# Patient Record
Sex: FEMALE | Race: WHITE | NOT HISPANIC OR LATINO | Employment: OTHER | URBAN - METROPOLITAN AREA
[De-identification: names, ages, dates, MRNs, and addresses within clinical notes are randomized per-mention and may not be internally consistent; named-entity substitution may affect disease eponyms.]

---

## 2023-09-01 PROBLEM — J31.0 CHRONIC RHINITIS: Status: ACTIVE | Noted: 2023-09-01

## 2023-09-01 PROBLEM — H93.13 TINNITUS OF BOTH EARS: Status: ACTIVE | Noted: 2023-09-01

## 2023-09-01 PROBLEM — K14.8 TONGUE MASS: Status: ACTIVE | Noted: 2023-09-01

## 2023-09-01 PROBLEM — E87.6 HYPOKALEMIA: Status: ACTIVE | Noted: 2023-09-01

## 2023-09-01 PROBLEM — R05.3 CHRONIC COUGH: Status: ACTIVE | Noted: 2023-09-01

## 2023-09-01 PROBLEM — I48.11 LONGSTANDING PERSISTENT ATRIAL FIBRILLATION (MULTI): Status: ACTIVE | Noted: 2023-09-01

## 2023-09-01 PROBLEM — R42 DIZZINESS: Status: ACTIVE | Noted: 2023-09-01

## 2023-09-01 PROBLEM — I48.91 AF (ATRIAL FIBRILLATION) (MULTI): Status: ACTIVE | Noted: 2023-09-01

## 2023-09-01 PROBLEM — I10 HTN (HYPERTENSION): Status: ACTIVE | Noted: 2023-09-01

## 2023-09-01 PROBLEM — E04.2 NONTOXIC MULTINODULAR GOITER: Status: ACTIVE | Noted: 2023-09-01

## 2023-09-01 PROBLEM — H90.3 SENSORINEURAL HEARING LOSS, BILATERAL: Status: ACTIVE | Noted: 2023-09-01

## 2023-09-01 RX ORDER — AZELASTINE 1 MG/ML
2 SPRAY, METERED NASAL 2 TIMES DAILY
COMMUNITY
Start: 2021-03-03 | End: 2024-01-15 | Stop reason: WASHOUT

## 2023-09-01 RX ORDER — ERGOCALCIFEROL 1.25 MG/1
1 CAPSULE ORAL
COMMUNITY
Start: 2021-05-03 | End: 2024-05-20 | Stop reason: ALTCHOICE

## 2023-09-01 RX ORDER — WARFARIN SODIUM 5 MG/1
1 TABLET ORAL DAILY
COMMUNITY
Start: 2023-05-25

## 2023-09-01 RX ORDER — METOPROLOL SUCCINATE 25 MG/1
1 TABLET, EXTENDED RELEASE ORAL DAILY
COMMUNITY
Start: 2021-05-02

## 2023-09-01 RX ORDER — CELECOXIB 100 MG/1
100 CAPSULE ORAL DAILY
COMMUNITY
Start: 2023-06-29 | End: 2023-09-27

## 2023-09-01 RX ORDER — TRAMADOL HYDROCHLORIDE 50 MG/1
50 TABLET ORAL EVERY 8 HOURS PRN
COMMUNITY

## 2023-09-01 RX ORDER — HYDROXYZINE HYDROCHLORIDE 25 MG/1
1 TABLET, FILM COATED ORAL DAILY PRN
COMMUNITY
Start: 2023-06-29 | End: 2024-05-20 | Stop reason: ALTCHOICE

## 2023-09-01 RX ORDER — SERTRALINE HYDROCHLORIDE 25 MG/1
25 TABLET, FILM COATED ORAL DAILY
COMMUNITY
Start: 2023-06-29 | End: 2024-01-15 | Stop reason: WASHOUT

## 2023-09-01 RX ORDER — AMLODIPINE BESYLATE 2.5 MG/1
1 TABLET ORAL DAILY
COMMUNITY

## 2023-09-01 RX ORDER — FUROSEMIDE 40 MG/1
40 TABLET ORAL DAILY
COMMUNITY

## 2023-09-01 RX ORDER — FERROUS SULFATE, DRIED 160(50) MG
1 TABLET, EXTENDED RELEASE ORAL
COMMUNITY
Start: 2021-03-11

## 2023-09-01 RX ORDER — ACETAMINOPHEN 500 MG/1
500 CAPSULE, LIQUID FILLED ORAL EVERY 6 HOURS PRN
COMMUNITY
End: 2024-02-12 | Stop reason: SDUPTHER

## 2023-09-01 RX ORDER — OMEPRAZOLE 40 MG/1
CAPSULE, DELAYED RELEASE ORAL
COMMUNITY
End: 2024-01-15 | Stop reason: ALTCHOICE

## 2023-09-01 RX ORDER — ASCORBIC ACID 500 MG
500 TABLET ORAL DAILY
COMMUNITY

## 2023-12-11 ENCOUNTER — HOSPITAL ENCOUNTER (EMERGENCY)
Facility: HOSPITAL | Age: 88
Discharge: HOME | End: 2023-12-11
Attending: EMERGENCY MEDICINE
Payer: MEDICARE

## 2023-12-11 ENCOUNTER — APPOINTMENT (OUTPATIENT)
Dept: RADIOLOGY | Facility: HOSPITAL | Age: 88
End: 2023-12-11
Payer: MEDICARE

## 2023-12-11 ENCOUNTER — APPOINTMENT (OUTPATIENT)
Dept: CARDIOLOGY | Facility: HOSPITAL | Age: 88
End: 2023-12-11
Payer: MEDICARE

## 2023-12-11 VITALS
DIASTOLIC BLOOD PRESSURE: 75 MMHG | RESPIRATION RATE: 18 BRPM | TEMPERATURE: 97.7 F | SYSTOLIC BLOOD PRESSURE: 112 MMHG | HEART RATE: 59 BPM | HEIGHT: 65 IN | BODY MASS INDEX: 23.82 KG/M2 | OXYGEN SATURATION: 97 % | WEIGHT: 143 LBS

## 2023-12-11 DIAGNOSIS — R07.9 CHEST PAIN, UNSPECIFIED TYPE: Primary | ICD-10-CM

## 2023-12-11 LAB
ANION GAP SERPL CALC-SCNC: 10 MMOL/L
BASOPHILS # BLD AUTO: 0.06 X10*3/UL (ref 0–0.1)
BASOPHILS NFR BLD AUTO: 0.8 %
BUN SERPL-MCNC: 22 MG/DL (ref 8–25)
CALCIUM SERPL-MCNC: 8.6 MG/DL (ref 8.5–10.4)
CHLORIDE SERPL-SCNC: 105 MMOL/L (ref 97–107)
CO2 SERPL-SCNC: 26 MMOL/L (ref 24–31)
CREAT SERPL-MCNC: 0.6 MG/DL (ref 0.4–1.6)
EOSINOPHIL # BLD AUTO: 0.19 X10*3/UL (ref 0–0.4)
EOSINOPHIL NFR BLD AUTO: 2.4 %
ERYTHROCYTE [DISTWIDTH] IN BLOOD BY AUTOMATED COUNT: 13.4 % (ref 11.5–14.5)
GFR SERPL CREATININE-BSD FRML MDRD: 85 ML/MIN/1.73M*2
GLUCOSE SERPL-MCNC: 90 MG/DL (ref 65–99)
HCT VFR BLD AUTO: 41.9 % (ref 36–46)
HGB BLD-MCNC: 13.1 G/DL (ref 12–16)
IMM GRANULOCYTES # BLD AUTO: 0.02 X10*3/UL (ref 0–0.5)
IMM GRANULOCYTES NFR BLD AUTO: 0.3 % (ref 0–0.9)
LYMPHOCYTES # BLD AUTO: 2.22 X10*3/UL (ref 0.8–3)
LYMPHOCYTES NFR BLD AUTO: 27.9 %
MCH RBC QN AUTO: 29.1 PG (ref 26–34)
MCHC RBC AUTO-ENTMCNC: 31.3 G/DL (ref 32–36)
MCV RBC AUTO: 93 FL (ref 80–100)
MONOCYTES # BLD AUTO: 0.53 X10*3/UL (ref 0.05–0.8)
MONOCYTES NFR BLD AUTO: 6.6 %
NEUTROPHILS # BLD AUTO: 4.95 X10*3/UL (ref 1.6–5.5)
NEUTROPHILS NFR BLD AUTO: 62 %
NRBC BLD-RTO: 0 /100 WBCS (ref 0–0)
PLATELET # BLD AUTO: 172 X10*3/UL (ref 150–450)
POTASSIUM SERPL-SCNC: 4.2 MMOL/L (ref 3.4–5.1)
RBC # BLD AUTO: 4.5 X10*6/UL (ref 4–5.2)
SODIUM SERPL-SCNC: 141 MMOL/L (ref 133–145)
TROPONIN T SERPL-MCNC: 10 NG/L
TROPONIN T SERPL-MCNC: 10 NG/L
WBC # BLD AUTO: 8 X10*3/UL (ref 4.4–11.3)

## 2023-12-11 PROCEDURE — 80048 BASIC METABOLIC PNL TOTAL CA: CPT | Performed by: STUDENT IN AN ORGANIZED HEALTH CARE EDUCATION/TRAINING PROGRAM

## 2023-12-11 PROCEDURE — 2500000004 HC RX 250 GENERAL PHARMACY W/ HCPCS (ALT 636 FOR OP/ED): Performed by: EMERGENCY MEDICINE

## 2023-12-11 PROCEDURE — 93005 ELECTROCARDIOGRAM TRACING: CPT

## 2023-12-11 PROCEDURE — 99284 EMERGENCY DEPT VISIT MOD MDM: CPT | Performed by: EMERGENCY MEDICINE

## 2023-12-11 PROCEDURE — 85025 COMPLETE CBC W/AUTO DIFF WBC: CPT | Performed by: STUDENT IN AN ORGANIZED HEALTH CARE EDUCATION/TRAINING PROGRAM

## 2023-12-11 PROCEDURE — 84484 ASSAY OF TROPONIN QUANT: CPT | Performed by: STUDENT IN AN ORGANIZED HEALTH CARE EDUCATION/TRAINING PROGRAM

## 2023-12-11 PROCEDURE — 96375 TX/PRO/DX INJ NEW DRUG ADDON: CPT

## 2023-12-11 PROCEDURE — 71045 X-RAY EXAM CHEST 1 VIEW: CPT

## 2023-12-11 PROCEDURE — 36415 COLL VENOUS BLD VENIPUNCTURE: CPT | Performed by: STUDENT IN AN ORGANIZED HEALTH CARE EDUCATION/TRAINING PROGRAM

## 2023-12-11 PROCEDURE — 2500000001 HC RX 250 WO HCPCS SELF ADMINISTERED DRUGS (ALT 637 FOR MEDICARE OP): Performed by: STUDENT IN AN ORGANIZED HEALTH CARE EDUCATION/TRAINING PROGRAM

## 2023-12-11 PROCEDURE — 96374 THER/PROPH/DIAG INJ IV PUSH: CPT

## 2023-12-11 RX ORDER — FAMOTIDINE 10 MG/ML
20 INJECTION INTRAVENOUS ONCE
Status: COMPLETED | OUTPATIENT
Start: 2023-12-11 | End: 2023-12-11

## 2023-12-11 RX ORDER — ASPIRIN 325 MG
325 TABLET ORAL ONCE
Status: COMPLETED | OUTPATIENT
Start: 2023-12-11 | End: 2023-12-11

## 2023-12-11 RX ORDER — ONDANSETRON HYDROCHLORIDE 2 MG/ML
4 INJECTION, SOLUTION INTRAVENOUS ONCE
Status: COMPLETED | OUTPATIENT
Start: 2023-12-11 | End: 2023-12-11

## 2023-12-11 RX ORDER — MORPHINE SULFATE 4 MG/ML
4 INJECTION, SOLUTION INTRAMUSCULAR; INTRAVENOUS ONCE
Status: DISCONTINUED | OUTPATIENT
Start: 2023-12-11 | End: 2023-12-11 | Stop reason: HOSPADM

## 2023-12-11 RX ADMIN — FAMOTIDINE 20 MG: 10 INJECTION INTRAVENOUS at 12:47

## 2023-12-11 RX ADMIN — ONDANSETRON 4 MG: 2 INJECTION INTRAMUSCULAR; INTRAVENOUS at 12:47

## 2023-12-11 RX ADMIN — ASPIRIN 325 MG: 325 TABLET ORAL at 12:48

## 2023-12-11 ASSESSMENT — PAIN SCALES - GENERAL
PAINLEVEL_OUTOF10: 0 - NO PAIN
PAINLEVEL_OUTOF10: 0 - NO PAIN
PAINLEVEL_OUTOF10: 4

## 2023-12-11 ASSESSMENT — LIFESTYLE VARIABLES
EVER HAD A DRINK FIRST THING IN THE MORNING TO STEADY YOUR NERVES TO GET RID OF A HANGOVER: NO
EVER FELT BAD OR GUILTY ABOUT YOUR DRINKING: NO
HAVE YOU EVER FELT YOU SHOULD CUT DOWN ON YOUR DRINKING: NO
HAVE PEOPLE ANNOYED YOU BY CRITICIZING YOUR DRINKING: NO
REASON UNABLE TO ASSESS: NO

## 2023-12-11 ASSESSMENT — PAIN DESCRIPTION - PAIN TYPE: TYPE: ACUTE PAIN

## 2023-12-11 ASSESSMENT — PAIN - FUNCTIONAL ASSESSMENT
PAIN_FUNCTIONAL_ASSESSMENT: 0-10
PAIN_FUNCTIONAL_ASSESSMENT: 0-10

## 2023-12-11 ASSESSMENT — PAIN DESCRIPTION - LOCATION: LOCATION: CHEST

## 2023-12-11 NOTE — ED TRIAGE NOTES
HPI   Chief Complaint   Patient presents with    Chest Pain       The patient was seen by me in triage capacity at with the goal of rapid assessment and initiation of care.  The patient will have a complete history and physical exam performed and documented by another provider.    89-year-old female presents with chest pain.  History of atrial fibrillation.  Per patient patient's daughter, patient had a one-time episode of severe chest pain for ED visit.    Per chart review, patient on warfarin.            Medical Decision Making  Patient is to go to main bed for further evaluation.  Labs, EKG, chest x-ray ordered.        Procedure  Procedures

## 2023-12-11 NOTE — ED PROVIDER NOTES
HPI   Chief Complaint   Patient presents with    Chest Pain       A 89-year-old female who presents to the emergency department with concerns of chest pain that started today.  Patient is accompanied by her 2 daughters who provide the history.  The daughter states that the patient was sitting in her chair today and started saying that her chest felt very tight.  The daughter states that this lasted for about 30 seconds.  Patient does have a history of atrial fibrillation.  The daughter states that she hit the patient's chest to try to get her out of A-fib without what was going on.  The patient states that the pain subsided after about 30 seconds and she has had some random bouts of more dull anterior chest pain since.  She takes Coumadin and metoprolol 25mg XL qd for the A-fib.  Patient currently does not have any chest pain.  There is no shortness of breath, fever, chills, cough, nasal congestion, abdominal pain, nausea, vomiting, diaphoresis.  Her cardiologist is Dr. Mcnally.  Her primary care doctor is Dr. Cowan.         Please see HPI for pertinent positive and negative ROS.                   No data recorded                Patient History   Past Medical History:   Diagnosis Date    Other secondary hypertension     Other secondary hypertension    Personal history of other diseases of the circulatory system     History of hypertension     Past Surgical History:   Procedure Laterality Date    OTHER SURGICAL HISTORY  03/02/2021    Appendectomy     No family history on file.  Social History     Tobacco Use    Smoking status: Not on file    Smokeless tobacco: Not on file   Substance Use Topics    Alcohol use: Not on file    Drug use: Not on file       Physical Exam   ED Triage Vitals [12/11/23 1149]   Temp Heart Rate Resp BP   36.5 °C (97.7 °F) 65 18 108/61      SpO2 Temp src Heart Rate Source Patient Position   100 % -- -- --      BP Location FiO2 (%)     -- --       Physical Exam  GENERAL APPEARANCE: Awake and  alert. No acute distress.   VITAL SIGNS: As per the nurses' triage record.  HEENT: Normocephalic, atraumatic. Extraocular muscles are intact. Conjunctiva are pink. Negative scleral icterus. Mucous membranes are moist. Tongue in the midline. Oropharynx clear, uvula midline.   NECK: Soft, nontender and supple, full gross ROM, no meningeal signs.  CHEST: Nontender to palpation. Clear to auscultation bilaterally. No rales, rhonchi, or wheezing. Symmetric rise and fall of chest wall.   HEART: Clear S1 and S2. Regular rate and rhythm. No murmurs appreciated on auscultation.  Strong and equal pulses in the extremities.  ABDOMEN: Soft, nontender, nondistended, positive bowel sounds, no palpable masses.  MUSCULOSKELETAL: The calves are nontender to palpation. Full gross active range of motion.  NEUROLOGICAL: Awake, alert and oriented x 3. Motor power intact in the upper and lower extremities. Sensation is intact to light touch in the upper and lower extremities. Patient answering questions appropriately.   IMMUNOLOGICAL: No lymphatic streaking noted  DERMATOLOGIC: Warm and dry without petechiae, rashes, or ecchymosis noted on visible skin.   PYSCH: Cooperative with appropriate mood and affect.  ED Course & MDM   ED Course as of 12/11/23 1544   Mon Dec 11, 2023   1355 X-ray shows no acute cardiopulmonary process. [SC]   1434 HEART SCORE 4 [SC]   1532 Initial Troponin T 10, repeat troponin T 10. Consult placed to Dr. Mcnally, patient's cardiologist.  [SC]      ED Course User Index  [SC] Key Hitchcock PA-C         Diagnoses as of 12/11/23 1544   Chest pain, unspecified type       Medical Decision Making  Parts of this chart have been completed using voice recognition software. Please excuse any errors of transcription.  My thought process and reason for plan has been formulated from the time that I saw the patient until the time of disposition and is not specific to one specific moment during their visit and furthermore my  MDM encompasses this entire chart and not only this text box.      HPI: Detailed above.    Exam: A medically appropriate exam performed, outlined above, given the known history and presentation.    History obtained from: Patient    EKG: See my supervising physician's EKG interpretation    Social Determinants of Health considered during this visit: Lives at home    Medications given during visit:  Medications   morphine injection 4 mg (4 mg intravenous Not Given 12/11/23 1210)   aspirin tablet 325 mg (325 mg oral Given 12/11/23 1248)   famotidine PF (Pepcid) injection 20 mg (20 mg intravenous Given 12/11/23 1247)   ondansetron (Zofran) injection 4 mg (4 mg intravenous Given 12/11/23 1247)        Diagnostic/tests  Labs Reviewed   CBC WITH AUTO DIFFERENTIAL - Abnormal       Result Value    WBC 8.0      nRBC 0.0      RBC 4.50      Hemoglobin 13.1      Hematocrit 41.9      MCV 93      MCH 29.1      MCHC 31.3 (*)     RDW 13.4      Platelets 172      Neutrophils % 62.0      Immature Granulocytes %, Automated 0.3      Lymphocytes % 27.9      Monocytes % 6.6      Eosinophils % 2.4      Basophils % 0.8      Neutrophils Absolute 4.95      Immature Granulocytes Absolute, Automated 0.02      Lymphocytes Absolute 2.22      Monocytes Absolute 0.53      Eosinophils Absolute 0.19      Basophils Absolute 0.06     BASIC METABOLIC PANEL - Normal    Glucose 90      Sodium 141      Potassium 4.2      Chloride 105      Bicarbonate 26      Urea Nitrogen 22      Creatinine 0.60      eGFR 85      Calcium 8.6      Anion Gap 10     SERIAL TROPONIN, INITIAL ([facility]) - Normal    Troponin T, High Sensitivity 10     SERIAL TROPONIN,  2 HOUR ([facility]) - Normal    Troponin T, High Sensitivity 10     TROPONIN T SERIES, HIGH SENSITIVITY (0, 2 HR, 6 HR)    Narrative:     The following orders were created for panel order Troponin T Series, High Sensitivity (0, 2HR, 6HR).  Procedure                               Abnormality         Status                      ---------                               -----------         ------                     Serial Troponin, Initial...[[id number]]  Normal              Final result               Serial Troponin, 2 Hour ...[[id number]]  Normal              Final result               Serial Troponin, 6 Hour ...[[id number]]                                                   Please view results for these tests on the individual orders.   SERIAL TROPONIN, 6 HOUR ([facility])      XR chest 1 view   Final Result   No evidence of acute cardiopulmonary process.             MACRO:   None        Signed by: Darryn Akhtar 12/11/2023 1:33 PM   Dictation workstation:   [id number]           Considerations/further MDM:  Patient was seen in conjucntion with my supervising physician,  . Please refer to his / her note.    I have considered and evaluated for the following diagnoses and estimate there is LOW risk for the following diagnoses: ACUTE CORONARY SYNDROME INCLUDING MI, AORTIC DISSECTION, PERICARDIAL EFFUSION or TAMPONADE, PULMONARY EMBOLISM, PNEUMONIA, PNEUMOTHORAX, RESPIRATORY DISTRESS or COMPROMISE, MALIGNANT DYSRHYTHMIA or HYPERTENSION, SEPSIS.     I discussed the case with patient's cardiologist Dr. Horton.  I went over the laboratory eval, heart score 4, and patient's history consistent with sharp onset of anterior chest wall pain x 30 seconds with dull or episodes of chest pain afterwards.     He states the patient is safe for discharge with follow-up with him in an outpatient setting.  He relates the patient to take over-the-counter ibuprofen for 1 to 2 days.  He told me to have the patient call his office to schedule an appointment.  This will be listed on the patient's discharge paperwork.  Daughter states that patient does have an appoint with Dr. Mcnally this Thursday.    Patient was discharged with instructions to follow-up with cardiologist and her primary care doctor within 1 to 2 days.  She will take over-the-counter ibuprofen x 1 to 2 days.   Patient return to emergency department new or worsening symptoms.      Procedure  Procedures     Key Hitchcock PA-C  12/11/23 1549

## 2023-12-11 NOTE — DISCHARGE INSTRUCTIONS
Please follow-up with Dr. Mcnally.  We did call him today and he would like you to call his office to schedule an outpatient appointment.    Follow-up with your primary care doctor 1 to 2 days.    Please take over-the-counter ibuprofen for the chest wall pain x 1 to 2 days.    Please return to the emergency department new or worsening symptoms.

## 2023-12-11 NOTE — PROGRESS NOTES
Attestation note/supervisory note for SUBHASH Hitchcock      The patient is a 89-year-old female presenting to the emergency department for evaluation of chest pain.  The patient reportedly had several spasms of chest pain several hours prior to arrival.  Her daughter thought that she may be in rapid A-fib so she did cross her chest to see if she could get some improvement in her symptoms.  The patient currently states that she does not have any symptoms.  She denies having any headache or visual changes.  No chest pain or shortness of breath.  No abdominal pain.  No nausea vomiting.  No diarrhea or constipation but no urinary complaints.  The patient's daughter reports that she was complaining of chest pain prior to arrival.  She reportedly does have a history of atrial fibrillation and is on Eliquis.  Her cardiologist is Dr. Mcnally.  She reportedly does not have any history of CAD or ACS.  No history of PE or DVT.  No cough or congestion.  No fever or chills.  No recent injury or trauma.  All pertinent positives and negatives are recorded above.  All other systems reviewed and otherwise negative.  Vital signs within normal limits.  Physical exam with a well-nourished well-developed female in no acute distress.  HEENT exam with dry mucous membranes but otherwise unremarkable.  She has no evidence of airway compromise or respiratory distress.  Abdominal exam is benign.  She is able to converse without difficulty.  She has no gross motor, neurologic or vascular deficits on exam.      EKG with atrial fibrillation at 65 bpm, normal axis, normal voltage, normal ST segment, and a slight diffuse flattening of the T waves      Oral aspirin, IV Pepcid, IV morphine and IV Zofran ordered.      Diagnostic labs without significant abnormality      Initial Troponin T 10. Repeat trop T 10      Heart score 4      XR chest 1 view   Final Result   No evidence of acute cardiopulmonary process.             MACRO:   None        Signed by:  Darryn Anyu 12/11/2023 1:33 PM   Dictation workstation:   [id number]           The patient does not have any notes of ischemia on EKG or cardiac enzymes.  No events on telemetry.  She has no acute process on chest x-ray.  No evidence of pneumothorax or pneumonia.  No evidence of CHF.       The patient does have a heart score of 4 and her cardiologist, Dr. Mcnally, was consulted.  He did recommend outpatient follow-up in his office for further management of her symptoms but did not feel that she needed to be admitted at this time.        The patient was released in good condition in the company of her daughters.  She will follow-up with her [facility] physician within 1 to 2 days for further management of her current symptoms.  She will also follow-up with cardiology within 1 to 2 days for further management of her chest pain.  She will return to the emergency department sooner with worsening of symptoms or onset of new symptoms.      Impression/diagnosis  Chest pain, substernal      I personally saw the patient and performed a substantive portion of the visit including all aspects of the medical decision making.      I reviewed the results of the diagnostic labs and diagnostic imaging.  Formal radiology reading was completed by the radiologist    Caitie Singer MD

## 2023-12-14 ENCOUNTER — OFFICE VISIT (OUTPATIENT)
Dept: CARDIOLOGY | Facility: CLINIC | Age: 88
End: 2023-12-14
Payer: MEDICARE

## 2023-12-14 VITALS — HEART RATE: 72 BPM | SYSTOLIC BLOOD PRESSURE: 110 MMHG | DIASTOLIC BLOOD PRESSURE: 64 MMHG | OXYGEN SATURATION: 97 %

## 2023-12-14 DIAGNOSIS — I48.11 LONGSTANDING PERSISTENT ATRIAL FIBRILLATION (MULTI): Primary | ICD-10-CM

## 2023-12-14 DIAGNOSIS — I10 PRIMARY HYPERTENSION: ICD-10-CM

## 2023-12-14 LAB
Q ONSET: 225 MS
QRS COUNT: 10 BEATS
QRS DURATION: 82 MS
QT INTERVAL: 426 MS
QTC CALCULATION(BAZETT): 443 MS
QTC FREDERICIA: 437 MS
R AXIS: 61 DEGREES
T AXIS: 57 DEGREES
T OFFSET: 438 MS
VENTRICULAR RATE: 65 BPM

## 2023-12-14 PROCEDURE — 99213 OFFICE O/P EST LOW 20 MIN: CPT | Performed by: INTERNAL MEDICINE

## 2023-12-14 PROCEDURE — 1126F AMNT PAIN NOTED NONE PRSNT: CPT | Performed by: INTERNAL MEDICINE

## 2023-12-14 PROCEDURE — 3078F DIAST BP <80 MM HG: CPT | Performed by: INTERNAL MEDICINE

## 2023-12-14 PROCEDURE — 3074F SYST BP LT 130 MM HG: CPT | Performed by: INTERNAL MEDICINE

## 2023-12-14 PROCEDURE — 1159F MED LIST DOCD IN RCRD: CPT | Performed by: INTERNAL MEDICINE

## 2023-12-14 PROCEDURE — 1036F TOBACCO NON-USER: CPT | Performed by: INTERNAL MEDICINE

## 2023-12-14 RX ORDER — TRAZODONE HYDROCHLORIDE 50 MG/1
25 TABLET ORAL NIGHTLY
COMMUNITY

## 2023-12-14 ASSESSMENT — ENCOUNTER SYMPTOMS
OCCASIONAL FEELINGS OF UNSTEADINESS: 0
COUGH: 0
SHORTNESS OF BREATH: 0
HEMATURIA: 0
ABDOMINAL PAIN: 0
DYSPNEA ON EXERTION: 0
DEPRESSION: 1
PALPITATIONS: 0
DYSURIA: 0
NUMBNESS: 0
PARESTHESIAS: 0
LOSS OF SENSATION IN FEET: 0
BLURRED VISION: 0

## 2023-12-14 ASSESSMENT — PATIENT HEALTH QUESTIONNAIRE - PHQ9
SUM OF ALL RESPONSES TO PHQ9 QUESTIONS 1 & 2: 0
2. FEELING DOWN, DEPRESSED OR HOPELESS: NOT AT ALL
1. LITTLE INTEREST OR PLEASURE IN DOING THINGS: NOT AT ALL

## 2023-12-14 NOTE — ASSESSMENT & PLAN NOTE
No prolonged palpitations, atypical chest discomfort which sounds inflammatory.  Suggested NSAI PRN.

## 2023-12-14 NOTE — PROGRESS NOTES
Jose Tran is a 89 y.o. female.    Chief Complaint:  6 MONTH F/U and hosp f/u    HPI  Patient went to the emergency department on Monday with some sharp chest discomfort and some unusual feelings in the chest.  Negative workup and then discharged back to home.  Review of Systems   Constitutional: Negative for malaise/fatigue.   HENT:  Negative for congestion.    Eyes:  Negative for blurred vision.   Cardiovascular:  Negative for chest pain, dyspnea on exertion and palpitations.   Respiratory:  Negative for cough and shortness of breath.    Musculoskeletal:  Negative for joint pain.   Gastrointestinal:  Negative for abdominal pain.   Genitourinary:  Negative for dysuria and hematuria.   Neurological:  Negative for numbness and paresthesias.       Objective   Constitutional:       Appearance: Not in distress.   Eyes:      Conjunctiva/sclera: Conjunctivae normal.   Neck:      Vascular: JVD normal.   Pulmonary:      Breath sounds: Normal breath sounds. No wheezing. No rhonchi. No rales.   Cardiovascular:      Normal rate. Irregularly irregular rhythm.      Murmurs: There is no murmur.      No gallop.  No click. No rub.   Abdominal:      Palpations: Abdomen is soft.   Neurological:      General: No focal deficit present.      Mental Status: Alert.         Assessment/Plan   The primary encounter diagnosis was Longstanding persistent atrial fibrillation (CMS/HCC). A diagnosis of Primary hypertension was also pertinent to this visit.    Longstanding persistent atrial fibrillation (CMS/HCC)  No prolonged palpitations, atypical chest discomfort which sounds inflammatory.  Suggested NSAI PRN.    HTN (hypertension)  Blood pressure is very well-controlled we will continue to follow.

## 2023-12-18 ENCOUNTER — OFFICE VISIT (OUTPATIENT)
Dept: OTOLARYNGOLOGY | Facility: CLINIC | Age: 88
End: 2023-12-18
Payer: MEDICARE

## 2023-12-18 VITALS — WEIGHT: 147 LBS | HEIGHT: 65 IN | TEMPERATURE: 97.5 F | BODY MASS INDEX: 24.49 KG/M2

## 2023-12-18 DIAGNOSIS — H93.8X2 MASS OF LEFT EAR: Primary | ICD-10-CM

## 2023-12-18 DIAGNOSIS — H61.23 BILATERAL IMPACTED CERUMEN: ICD-10-CM

## 2023-12-18 PROCEDURE — 99213 OFFICE O/P EST LOW 20 MIN: CPT | Performed by: OTOLARYNGOLOGY

## 2023-12-18 PROCEDURE — 1159F MED LIST DOCD IN RCRD: CPT | Performed by: OTOLARYNGOLOGY

## 2023-12-18 PROCEDURE — 1036F TOBACCO NON-USER: CPT | Performed by: OTOLARYNGOLOGY

## 2023-12-18 PROCEDURE — 1126F AMNT PAIN NOTED NONE PRSNT: CPT | Performed by: OTOLARYNGOLOGY

## 2023-12-18 PROCEDURE — 1160F RVW MEDS BY RX/DR IN RCRD: CPT | Performed by: OTOLARYNGOLOGY

## 2023-12-18 NOTE — PROGRESS NOTES
DOMINIQUE Tran is a 89 y.o. female history of recurrent cerumen impactions, nasal polyps.  She has been doing reasonably well.  Wears hearing aids bilaterally.  Has been demonstrating symptoms of needing cleaning.  She has been doing okay with her nasal symptoms.  A new issue was brought up that she has been fussing with a small mass/cyst posterior to the left earlobe.  Physically this is consistent with a sebaceous cyst.      Past Medical History:   Diagnosis Date    Hypertension     Other secondary hypertension     Other secondary hypertension    Personal history of other diseases of the circulatory system     History of hypertension            Medications:     Current Outpatient Medications:     acetaminophen (Tylenol) 500 mg capsule, Take 1 capsule (500 mg) by mouth every 6 hours if needed., Disp: , Rfl:     amLODIPine (Norvasc) 2.5 mg tablet, Take 1 tablet (2.5 mg) by mouth once daily., Disp: , Rfl:     ascorbic acid (Vitamin C) 500 mg tablet, Take 1 tablet (500 mg) by mouth once daily., Disp: , Rfl:     calcium carbonate-vitamin D3 (Oysco 500/D) 500 mg-5 mcg (200 unit) tablet, Take 1 tablet by mouth 2 times a day with meals., Disp: , Rfl:     ergocalciferol (Vitamin D-2) 1.25 MG (65878 UT) capsule, Take 1 capsule (1,250 mcg) by mouth 1 (one) time per week., Disp: , Rfl:     fexofenadine/pseudoephedrine (ALLEGRA-D 24 HOUR ORAL), Allegra-D 24 Hour, Disp: , Rfl:     furosemide (Lasix) 40 mg tablet, Take 1 tablet (40 mg) by mouth once daily., Disp: , Rfl:     metoprolol succinate XL (Toprol-XL) 25 mg 24 hr tablet, Take 1 tablet (25 mg) by mouth once daily., Disp: , Rfl:     traMADol (Ultram) 50 mg tablet, Take 1 tablet (50 mg) by mouth every 6 hours if needed., Disp: , Rfl:     vit C/E/Zn/coppr/lutein/zeaxan (PRESERVISION AREDS-2 ORAL), Take by mouth., Disp: , Rfl:     warfarin (Coumadin) 5 mg tablet, Take 1 tablet (5 mg) by mouth once daily. As directed, Disp: , Rfl:     azelastine (Astelin) 137 mcg  "(0.1 %) nasal spray, Administer 2 sprays into each nostril 2 times a day. 30 DAYS, Disp: , Rfl:     hydrOXYzine HCL (Atarax) 25 mg tablet, Take 1 tablet (25 mg) by mouth once daily as needed for anxiety., Disp: , Rfl:     omeprazole (PriLOSEC) 40 mg DR capsule, Omeprazole CPDR  Refills: 0     Active, Disp: , Rfl:     sertraline (Zoloft) 25 mg tablet, Take 1 tablet (25 mg) by mouth once daily., Disp: , Rfl:     traZODone (Desyrel) 50 mg tablet, Take 1 tablet (50 mg) by mouth once daily at bedtime. 1/2 tab, Disp: , Rfl:      Allergies:  Allergies   Allergen Reactions    Codeine Nausea And Vomiting    Hydrocodone-Homatropine Diarrhea     Vomiting         Physical Exam:  Last Recorded Vitals  Temperature 36.4 °C (97.5 °F), height 1.651 m (5' 5\"), weight 66.7 kg (147 lb).  General:     General appearance: Well-developed, well-nourished in no acute distress.       Voice:  normal       Head/face: Normal appearance; nontender to palpation     Facial nerve function: Normal and symmetric bilaterally.    Oral/oropharynx:     Oral vestibule: Normal labial and gingival mucosa     Tongue/floor of mouth: Normal without lesion     Oropharynx: Clear.  No lesions present of the hard/soft palate, posterior pharynx    Neck:     Neck: Normal appearance, trachea midline     Salivary glands: Normal to palpation bilaterally     Lymph nodes: No cervical lymphadenopathy to palpation     Thyroid: No thyromegaly.  No palpable nodules     Range of motion: Normal    Neurological:     Cortical functions: Alert and oriented x3, appropriate affect       Larynx/hypopharynx:     Laryngeal findings: Mirror exam inadequate or limited secondary to enlarged base of tongue and/or excessive gagging    Ear:     Ear canal: Normal bilaterally after cleaning down cerumen impaction bilaterally     Tympanic membrane: Intact and mobile bilaterally     Pinna: Normal bilaterally.  1 cm postauricular cyst around the earlobe on the left-hand side as described above.  " No skin changes.  No active bleeding.     Hearing:  Gross hearing assessment normal by voice    Nose:     Visualized using: Anterior rhinoscopy     Nasopharynx: Inadequate mirror exam secondary to gag, anatomy.       Nasal dorsum: Nontraumatic midline appearance     Septum: Midline     Inferior turbinates: Normally sized     Mucosa: Bilateral, pink, normal appearing       Assessment/Plan   Ears cleaned bilaterally.  Replaced hearing aids and this showed improvement.  I have offered removal of the cyst behind her left ear.  We could do this in the office but would need to schedule some additional time.  They will think on this and find some time to schedule.  I will see her back in 6 months for routine cerumen management         Ramakrishna Driver MD

## 2023-12-26 ENCOUNTER — APPOINTMENT (OUTPATIENT)
Dept: RADIOLOGY | Facility: HOSPITAL | Age: 88
End: 2023-12-26
Payer: MEDICARE

## 2023-12-26 ENCOUNTER — APPOINTMENT (OUTPATIENT)
Dept: CARDIOLOGY | Facility: HOSPITAL | Age: 88
End: 2023-12-26
Payer: MEDICARE

## 2023-12-26 ENCOUNTER — HOSPITAL ENCOUNTER (EMERGENCY)
Facility: HOSPITAL | Age: 88
Discharge: HOME | End: 2023-12-26
Attending: EMERGENCY MEDICINE
Payer: MEDICARE

## 2023-12-26 VITALS
HEART RATE: 61 BPM | DIASTOLIC BLOOD PRESSURE: 72 MMHG | OXYGEN SATURATION: 99 % | BODY MASS INDEX: 24.16 KG/M2 | WEIGHT: 145 LBS | HEIGHT: 65 IN | RESPIRATION RATE: 18 BRPM | SYSTOLIC BLOOD PRESSURE: 124 MMHG | TEMPERATURE: 98.1 F

## 2023-12-26 DIAGNOSIS — R41.0 CONFUSION: Primary | ICD-10-CM

## 2023-12-26 DIAGNOSIS — R45.1 RESTLESSNESS: ICD-10-CM

## 2023-12-26 LAB
ALBUMIN SERPL-MCNC: 4 G/DL (ref 3.5–5)
ALP BLD-CCNC: 89 U/L (ref 35–125)
ALT SERPL-CCNC: 12 U/L (ref 5–40)
ANION GAP SERPL CALC-SCNC: 11 MMOL/L
APPEARANCE UR: CLEAR
AST SERPL-CCNC: 25 U/L (ref 5–40)
BASOPHILS # BLD AUTO: 0.07 X10*3/UL (ref 0–0.1)
BASOPHILS NFR BLD AUTO: 0.9 %
BILIRUB SERPL-MCNC: 0.6 MG/DL (ref 0.1–1.2)
BILIRUB UR STRIP.AUTO-MCNC: NEGATIVE MG/DL
BUN SERPL-MCNC: 21 MG/DL (ref 8–25)
CALCIUM SERPL-MCNC: 8.8 MG/DL (ref 8.5–10.4)
CHLORIDE SERPL-SCNC: 103 MMOL/L (ref 97–107)
CO2 SERPL-SCNC: 25 MMOL/L (ref 24–31)
COLOR UR: NORMAL
CREAT SERPL-MCNC: 0.6 MG/DL (ref 0.4–1.6)
EOSINOPHIL # BLD AUTO: 0.27 X10*3/UL (ref 0–0.4)
EOSINOPHIL NFR BLD AUTO: 3.4 %
ERYTHROCYTE [DISTWIDTH] IN BLOOD BY AUTOMATED COUNT: 13.2 % (ref 11.5–14.5)
FLUAV RNA RESP QL NAA+PROBE: NOT DETECTED
FLUBV RNA RESP QL NAA+PROBE: NOT DETECTED
GFR SERPL CREATININE-BSD FRML MDRD: 85 ML/MIN/1.73M*2
GLUCOSE SERPL-MCNC: 85 MG/DL (ref 65–99)
GLUCOSE UR STRIP.AUTO-MCNC: NORMAL MG/DL
HCT VFR BLD AUTO: 43.2 % (ref 36–46)
HGB BLD-MCNC: 13.5 G/DL (ref 12–16)
IMM GRANULOCYTES # BLD AUTO: 0.03 X10*3/UL (ref 0–0.5)
IMM GRANULOCYTES NFR BLD AUTO: 0.4 % (ref 0–0.9)
KETONES UR STRIP.AUTO-MCNC: NEGATIVE MG/DL
LEUKOCYTE ESTERASE UR QL STRIP.AUTO: NEGATIVE
LIPASE SERPL-CCNC: 41 U/L (ref 16–63)
LYMPHOCYTES # BLD AUTO: 2.29 X10*3/UL (ref 0.8–3)
LYMPHOCYTES NFR BLD AUTO: 28.8 %
MCH RBC QN AUTO: 28.7 PG (ref 26–34)
MCHC RBC AUTO-ENTMCNC: 31.3 G/DL (ref 32–36)
MCV RBC AUTO: 92 FL (ref 80–100)
MONOCYTES # BLD AUTO: 0.53 X10*3/UL (ref 0.05–0.8)
MONOCYTES NFR BLD AUTO: 6.7 %
MUCOUS THREADS #/AREA URNS AUTO: NORMAL /LPF
NEUTROPHILS # BLD AUTO: 4.77 X10*3/UL (ref 1.6–5.5)
NEUTROPHILS NFR BLD AUTO: 59.8 %
NITRITE UR QL STRIP.AUTO: NEGATIVE
NRBC BLD-RTO: 0 /100 WBCS (ref 0–0)
PH UR STRIP.AUTO: 6.5 [PH]
PLATELET # BLD AUTO: 218 X10*3/UL (ref 150–450)
POTASSIUM SERPL-SCNC: 4.7 MMOL/L (ref 3.4–5.1)
PROT SERPL-MCNC: 7 G/DL (ref 5.9–7.9)
PROT UR STRIP.AUTO-MCNC: NORMAL MG/DL
RBC # BLD AUTO: 4.7 X10*6/UL (ref 4–5.2)
RBC # UR STRIP.AUTO: NEGATIVE /UL
RBC #/AREA URNS AUTO: NORMAL /HPF
SARS-COV-2 RNA RESP QL NAA+PROBE: NOT DETECTED
SODIUM SERPL-SCNC: 139 MMOL/L (ref 133–145)
SP GR UR STRIP.AUTO: 1.02
SQUAMOUS #/AREA URNS AUTO: NORMAL /HPF
TROPONIN T SERPL-MCNC: 11 NG/L
UROBILINOGEN UR STRIP.AUTO-MCNC: NORMAL MG/DL
WBC # BLD AUTO: 8 X10*3/UL (ref 4.4–11.3)
WBC #/AREA URNS AUTO: NORMAL /HPF

## 2023-12-26 PROCEDURE — 71045 X-RAY EXAM CHEST 1 VIEW: CPT

## 2023-12-26 PROCEDURE — 2500000004 HC RX 250 GENERAL PHARMACY W/ HCPCS (ALT 636 FOR OP/ED): Performed by: EMERGENCY MEDICINE

## 2023-12-26 PROCEDURE — 80053 COMPREHEN METABOLIC PANEL: CPT | Performed by: EMERGENCY MEDICINE

## 2023-12-26 PROCEDURE — 99285 EMERGENCY DEPT VISIT HI MDM: CPT | Performed by: EMERGENCY MEDICINE

## 2023-12-26 PROCEDURE — 2550000001 HC RX 255 CONTRASTS: Performed by: EMERGENCY MEDICINE

## 2023-12-26 PROCEDURE — 83690 ASSAY OF LIPASE: CPT | Performed by: EMERGENCY MEDICINE

## 2023-12-26 PROCEDURE — 36415 COLL VENOUS BLD VENIPUNCTURE: CPT | Performed by: EMERGENCY MEDICINE

## 2023-12-26 PROCEDURE — 96360 HYDRATION IV INFUSION INIT: CPT | Mod: 59

## 2023-12-26 PROCEDURE — 93005 ELECTROCARDIOGRAM TRACING: CPT

## 2023-12-26 PROCEDURE — 84484 ASSAY OF TROPONIN QUANT: CPT | Performed by: EMERGENCY MEDICINE

## 2023-12-26 PROCEDURE — 70450 CT HEAD/BRAIN W/O DYE: CPT

## 2023-12-26 PROCEDURE — 85025 COMPLETE CBC W/AUTO DIFF WBC: CPT | Performed by: EMERGENCY MEDICINE

## 2023-12-26 PROCEDURE — 74177 CT ABD & PELVIS W/CONTRAST: CPT

## 2023-12-26 PROCEDURE — 81001 URINALYSIS AUTO W/SCOPE: CPT | Performed by: EMERGENCY MEDICINE

## 2023-12-26 PROCEDURE — 87635 SARS-COV-2 COVID-19 AMP PRB: CPT | Performed by: EMERGENCY MEDICINE

## 2023-12-26 RX ORDER — ACETAMINOPHEN 325 MG/1
650 TABLET ORAL ONCE
Status: COMPLETED | OUTPATIENT
Start: 2023-12-26 | End: 2023-12-26

## 2023-12-26 RX ADMIN — SODIUM CHLORIDE 500 ML: 900 INJECTION, SOLUTION INTRAVENOUS at 09:45

## 2023-12-26 RX ADMIN — IOHEXOL 75 ML: 350 INJECTION, SOLUTION INTRAVENOUS at 11:01

## 2023-12-26 RX ADMIN — ACETAMINOPHEN 650 MG: 325 TABLET ORAL at 09:43

## 2023-12-26 ASSESSMENT — LIFESTYLE VARIABLES
HAVE YOU EVER FELT YOU SHOULD CUT DOWN ON YOUR DRINKING: NO
EVER FELT BAD OR GUILTY ABOUT YOUR DRINKING: NO
EVER HAD A DRINK FIRST THING IN THE MORNING TO STEADY YOUR NERVES TO GET RID OF A HANGOVER: NO
HAVE PEOPLE ANNOYED YOU BY CRITICIZING YOUR DRINKING: NO
REASON UNABLE TO ASSESS: NO

## 2023-12-26 ASSESSMENT — PAIN - FUNCTIONAL ASSESSMENT: PAIN_FUNCTIONAL_ASSESSMENT: 0-10

## 2023-12-26 NOTE — ED PROVIDER NOTES
HPI   No chief complaint on file.      HPI  See my MDM                  No data recorded                Patient History   Past Medical History:   Diagnosis Date    Hypertension     Other secondary hypertension     Other secondary hypertension    Personal history of other diseases of the circulatory system     History of hypertension     Past Surgical History:   Procedure Laterality Date    OTHER SURGICAL HISTORY  03/02/2021    Appendectomy     No family history on file.  Social History     Tobacco Use    Smoking status: Never    Smokeless tobacco: Never   Substance Use Topics    Alcohol use: Never    Drug use: Never       Physical Exam   ED Triage Vitals [12/26/23 0858]   Temp Heart Rate Resp BP   36.7 °C (98.1 °F) 61 18 124/72      SpO2 Temp Source Heart Rate Source Patient Position   99 % Temporal Brachial Sitting      BP Location FiO2 (%)     Left arm --       Physical Exam  CONSTITUTIONAL: Vital signs reviewed as charted, well-developed and in no distress  Eyes: Extraocular muscles are intact. Pupils equal round and reactive to light. Conjunctiva are pink.    ENT: Mucous membranes are moist. Tongue in the midline. Pharynx was without erythema or exudates, uvula midline  LUNGS: Breath sounds equal and clear to auscultation. Good air exchange, no wheezes rales or retractions, pulse oximetry is charted.  HEART: Regular rate and rhythm without murmur thrill or rub, strong tones, auscultation is normal.  ABDOMEN: Soft and nontender without guarding rebound rigidity or mass. Bowel sounds are present and normal in all quadrants. There is no palpable masses or aneurysms identified. No hepatosplenomegaly, normal abdominal exam.  Neuro: The patient is awake, alert and oriented ×3. Moving all 4 extremities and answering questions appropriately.   MUSCULOSKELETAL: The calves are nontender to palpation. Full gross active range of motion.   PSYCH: Awake alert oriented, normal mood and affect.  Skin:  Dry, normal color, warm  to the touch, no rash present.      ED Course & MDM   Diagnoses as of 12/26/23 1200   Confusion   Restlessness       Medical Decision Making  History obtained from: patient    Vital signs, nursing notes, current medications, past medical history, Surgical history, allergies, social history, family History were reviewed.         HPI:  Patient 89-year-old female presenting emergency room today for evaluation of confusion, urinary incontinence.  According to daughter been ongoing for about 4 days.  States has worsened.  States she brought her in today as she was just shaking in bed.  Concern for UTI.  Denies change in vision or feeling off balance.  Denies headache.  Denies chest pain or shortness of breath.  Denies extremity edema.  Daughter states she was also complaining about some suprapubic belly pain.      10 point ROS was reviewed and negative except Noted above in HPI.  DDX: as listed above    CT scan of the abdomen pelvis interpreted by the radiologist showed:  Impression:    1. Cardiomegaly.  2. Findings most compatible with multiple bilateral renal cysts.  Focal parenchymal loss midpole left kidney likely from previous  infection or infarction.  3. Probable cholecystectomy.  4. Colonic diverticulosis predominantly left-sided without CT  evidence for diverticulitis.  5. Osteoporosis.        CT scan of brain interpreted by the radiologist shows:  Impression:    No acute intracranial pathologic findings are identified.  Age-related findings are present.  Pansinusitis.              Medications administered during this visit (name and route): IV normal saline, oral acetaminophen  EKG interpretted by my attending physician    MDM Summary/considerations:  I estimate there is LOW risk for EPIGLOTTITIS, PNEUMONIA, MENINGITIS, OR URINARY TRACT INFECTION, thus I consider the discharge disposition reasonable. Also, there is no evidence for peritonitis, sepsis, or toxicity. We have discussed the diagnosis and risks, and  we agree with discharging home to follow-up with their primary doctor. We also discussed returning to the Emergency Department immediately if new or worsening symptoms occur. We have discussed the symptoms which are most concerning (e.g., changing or worsening pain, trouble swallowing or breathing, neck stiffness, fever) that necessitate immediate return.    Patient's workup here in the emergency department was grossly unremarkable.  Negative COVID-19 influenza.  Negative urinalysis, negative CT scan of the abdomen pelvis, negative CT scan of the brain.  Did speak with the patient and her daughter will be discharged home follow PCP 1 to 2 days for reevaluation.  Was discharged home in stable condition.        I saw this patient in conjunction with Dr. Burciaga, please see her supervision note.      All of the patient's questions were answered to the best of my ability.  Patient states understanding that they have been screened for an emergency today and we have not found any etiology of symptoms that requires emergent treatment or admission to the hospital at this point. They understand that they have not had definitive care day and require follow-up for treatment of their condition. They also state understanding that they may have an emergent condition that may potentially have not of detected at this visit and they must return to the emergency department if they develop any worsening of symptoms or new complaints.      Critical Care: Not warranted at this time    Prescriptions provided include: none    This chart was completed using voice recognition transcription software. Please excuse any errors of transcription including grammatical, punctuation, syntax and spelling errors.  Please contact me with any questions regarding this chart.    Procedure  Procedures     Rloand Llanes, RADHA-CNP  12/26/23 9986

## 2023-12-26 NOTE — ED TRIAGE NOTES
Patient daughter states the patient on Saturday had frequency and urgency with urination and now patient daughter states she is confused and restless.

## 2023-12-26 NOTE — PROGRESS NOTES
Attestation note/supervisory note for ADRIANNE Mario Alberto      The patient is a 89-year-old female presenting to the emergency department in the company of her daughter for evaluation of confusion, malaise and abdominal pain.  The patient denies having any symptoms at this time.  She denies any headache or visual changes.  No chest pain or shortness of breath.  No abdominal pain.  No nausea or vomiting.  No diarrhea or constipation.  She denies having any confusion.  The patient's daughter states that the patient has been more confused and exhibiting odd behavior such as getting undressed at night and complaining of abdominal pain for the past 4 to 5 days.  She states that she did message the patient's primary care physician, Dr. Cowan, and brought the patient to the emergency room for evaluation of her symptoms.  All pertinent positives and negatives are recorded above.  All other systems reviewed and otherwise negative.  Vital signs within normal limits.  Physical exam with a well-nourished well-developed female in no acute distress.  HEENT exam mucous membranes but otherwise unremarkable.  She has no evidence of airway compromise or respiratory distress.  Abdominal exam is benign.  She has no gross motor, neurologic or vascular deficits on exam.  NIH stroke scale score of 0.      EKG with atrial fibrillation at 62 bpm, normal axis, normal voltage, normal ST segment, normal T waves      IV fluids and oral acetaminophen ordered.      Diagnostic labs without significant abnormality      CT abdomen pelvis w IV contrast   Final Result   1. Cardiomegaly.   2. Findings most compatible with multiple bilateral renal cysts.   Focal parenchymal loss midpole left kidney likely from previous   infection or infarction.   3. Probable cholecystectomy.   4. Colonic diverticulosis predominantly left-sided without CT   evidence for diverticulitis.   5. Osteoporosis.             MACRO:   none        Signed by: Oswald Taylor 12/26/2023  11:40 AM   Dictation workstation:   [id number]      CT head wo IV contrast   Final Result   No acute intracranial pathologic findings are identified.   Age-related findings are present.   Pansinusitis.        MACRO:   none        Signed by: Oswald Taylor 12/26/2023 11:22 AM   Dictation workstation:   [id number]      XR chest 1 view   Final Result   No acute abnormalities.        MACRO:   None        Signed by: Seun Hair 12/26/2023 10:03 AM   Dictation workstation:   [id number]           The patient does not have any evidence of ischemia on EKG or cardiac enzymes.  No events on telemetry.  The patient does not have any neurologic deficits on exam.  The patient denies being confused but the patient does report symptoms consistent with dementia versus possible delirium.  There is no evidence of infection on diagnostic imaging and/or diagnostic labs.  The patient does not have any evidence of acute stroke or mass effect on CT head.  No evidence of pneumonia or CHF on chest x-ray.  No evidence of acute process on CT abdomen pelvis.  No evidence of ureterolithiasis.  No evidence of appendicitis or diverticulitis.        The patient was released in good condition in the company of her daughter.  She will follow-up with her primary care physician within 1 to 2 days for further management of her current symptoms.  She will return to the emergency department sooner with worsening of symptoms or onset of new symptoms.      tPA/TNK is not indicated given last known well time of 4 to 5 days ago and a current NIH stroke scale score of 0.        Impression/diagnosis  Altered mental status, by report  Abdominal pain, by report  Diastolic hypertension    I personally saw the patient and performed a substantive portion of the visit including all aspects of the medical decision making.      I reviewed the results of the diagnostic labs and diagnostic imaging.  Formal radiology reading was completed by the  radiologist      Caitie Singer MD

## 2023-12-27 LAB
Q ONSET: 225 MS
QRS COUNT: 11 BEATS
QRS DURATION: 68 MS
QT INTERVAL: 416 MS
QTC CALCULATION(BAZETT): 422 MS
QTC FREDERICIA: 420 MS
R AXIS: 80 DEGREES
T AXIS: 60 DEGREES
T OFFSET: 433 MS
VENTRICULAR RATE: 62 BPM

## 2023-12-29 ENCOUNTER — APPOINTMENT (OUTPATIENT)
Dept: OTOLARYNGOLOGY | Facility: CLINIC | Age: 88
End: 2023-12-29
Payer: MEDICARE

## 2024-01-15 ENCOUNTER — APPOINTMENT (OUTPATIENT)
Dept: GERIATRIC MEDICINE | Facility: CLINIC | Age: 89
End: 2024-01-15
Payer: MEDICARE

## 2024-01-15 ENCOUNTER — OFFICE VISIT (OUTPATIENT)
Dept: GERIATRIC MEDICINE | Facility: CLINIC | Age: 89
End: 2024-01-15
Payer: MEDICARE

## 2024-01-15 VITALS
TEMPERATURE: 98.8 F | WEIGHT: 147.4 LBS | HEART RATE: 76 BPM | RESPIRATION RATE: 18 BRPM | BODY MASS INDEX: 24.53 KG/M2 | DIASTOLIC BLOOD PRESSURE: 82 MMHG | SYSTOLIC BLOOD PRESSURE: 127 MMHG

## 2024-01-15 DIAGNOSIS — Z79.899 POLYPHARMACY: ICD-10-CM

## 2024-01-15 DIAGNOSIS — M17.0 PRIMARY OSTEOARTHRITIS OF BOTH KNEES: ICD-10-CM

## 2024-01-15 DIAGNOSIS — H91.93 BILATERAL HEARING LOSS, UNSPECIFIED HEARING LOSS TYPE: ICD-10-CM

## 2024-01-15 DIAGNOSIS — E55.9 VITAMIN D DEFICIENCY: Primary | ICD-10-CM

## 2024-01-15 DIAGNOSIS — F41.9 ANXIETY: ICD-10-CM

## 2024-01-15 DIAGNOSIS — I10 PRIMARY HYPERTENSION: ICD-10-CM

## 2024-01-15 DIAGNOSIS — G30.9 MODERATE ALZHEIMER'S DEMENTIA WITH PSYCHOTIC DISTURBANCE, UNSPECIFIED TIMING OF DEMENTIA ONSET (MULTI): ICD-10-CM

## 2024-01-15 DIAGNOSIS — G89.29 OTHER CHRONIC PAIN: ICD-10-CM

## 2024-01-15 DIAGNOSIS — R41.0 CONFUSION: ICD-10-CM

## 2024-01-15 DIAGNOSIS — E53.8 B12 DEFICIENCY: ICD-10-CM

## 2024-01-15 DIAGNOSIS — G47.00 INSOMNIA, UNSPECIFIED TYPE: ICD-10-CM

## 2024-01-15 DIAGNOSIS — F02.B2 MODERATE ALZHEIMER'S DEMENTIA WITH PSYCHOTIC DISTURBANCE, UNSPECIFIED TIMING OF DEMENTIA ONSET (MULTI): ICD-10-CM

## 2024-01-15 LAB
25(OH)D3 SERPL-MCNC: 99 NG/ML (ref 30–100)
TSH SERPL-ACNC: 0.48 MIU/L (ref 0.44–3.98)
VIT B12 SERPL-MCNC: 628 PG/ML (ref 211–911)

## 2024-01-15 PROCEDURE — 3074F SYST BP LT 130 MM HG: CPT | Performed by: NURSE PRACTITIONER

## 2024-01-15 PROCEDURE — 1036F TOBACCO NON-USER: CPT | Performed by: NURSE PRACTITIONER

## 2024-01-15 PROCEDURE — 1160F RVW MEDS BY RX/DR IN RCRD: CPT | Performed by: NURSE PRACTITIONER

## 2024-01-15 PROCEDURE — 99205 OFFICE O/P NEW HI 60 MIN: CPT | Performed by: NURSE PRACTITIONER

## 2024-01-15 PROCEDURE — 1159F MED LIST DOCD IN RCRD: CPT | Performed by: NURSE PRACTITIONER

## 2024-01-15 PROCEDURE — 3079F DIAST BP 80-89 MM HG: CPT | Performed by: NURSE PRACTITIONER

## 2024-01-15 PROCEDURE — 82306 VITAMIN D 25 HYDROXY: CPT | Performed by: NURSE PRACTITIONER

## 2024-01-15 PROCEDURE — 36415 COLL VENOUS BLD VENIPUNCTURE: CPT | Performed by: NURSE PRACTITIONER

## 2024-01-15 PROCEDURE — 1126F AMNT PAIN NOTED NONE PRSNT: CPT | Performed by: NURSE PRACTITIONER

## 2024-01-15 PROCEDURE — 99215 OFFICE O/P EST HI 40 MIN: CPT | Performed by: NURSE PRACTITIONER

## 2024-01-15 PROCEDURE — 82607 VITAMIN B-12: CPT | Performed by: NURSE PRACTITIONER

## 2024-01-15 PROCEDURE — 84443 ASSAY THYROID STIM HORMONE: CPT | Performed by: NURSE PRACTITIONER

## 2024-01-15 PROCEDURE — 1170F FXNL STATUS ASSESSED: CPT | Performed by: NURSE PRACTITIONER

## 2024-01-15 PROCEDURE — 1157F ADVNC CARE PLAN IN RCRD: CPT | Performed by: NURSE PRACTITIONER

## 2024-01-15 RX ORDER — CELECOXIB 100 MG/1
100 CAPSULE ORAL 2 TIMES DAILY
COMMUNITY

## 2024-01-15 RX ORDER — MEMANTINE HYDROCHLORIDE 5 MG/1
5 TABLET ORAL DAILY
COMMUNITY
End: 2024-01-15 | Stop reason: WASHOUT

## 2024-01-15 RX ORDER — MINERAL OIL
180 ENEMA (ML) RECTAL DAILY PRN
COMMUNITY
End: 2024-05-20 | Stop reason: ALTCHOICE

## 2024-01-15 RX ORDER — MEMANTINE HYDROCHLORIDE 5 MG/1
5 TABLET ORAL DAILY
Qty: 30 TABLET | Refills: 11 | Status: SHIPPED | OUTPATIENT
Start: 2024-01-15 | End: 2024-02-12 | Stop reason: SDUPTHER

## 2024-01-15 RX ORDER — DICLOFENAC SODIUM 10 MG/G
4 GEL TOPICAL 4 TIMES DAILY
Qty: 100 G | Refills: 1 | Status: SHIPPED | OUTPATIENT
Start: 2024-01-15 | End: 2024-06-03 | Stop reason: SDUPTHER

## 2024-01-15 ASSESSMENT — ACTIVITIES OF DAILY LIVING (ADL)
PREPARING_MEALS: TOTAL CARE
PATIENT'S MEMORY ADEQUATE TO SAFELY COMPLETE DAILY ACTIVITIES?: NO
DOING_HOUSEWORK: NEEDS ASSISTANCE
TOILETING: INDEPENDENT
DRESSING YOURSELF: INDEPENDENT
GROCERY_SHOPPING: TOTAL CARE
STILL_DRIVING: NO
GROOMING: INDEPENDENT
USING_TELEPHONE: NEEDS ASSISTANCE
ADEQUATE_TO_COMPLETE_ADL: NO
HEARING - LEFT EAR: HEARING AID
USING_TRANSPORTATION: TOTAL CARE
FEEDING YOURSELF: INDEPENDENT
NEEDS_ASSISTANCE_WITH_FOOD: INDEPENDENT
EATING: INDEPENDENT
BATHING: NEEDS ASSISTANCE
TAKING_MEDICATION: TOTAL CARE
JUDGMENT_ADEQUATE_SAFELY_COMPLETE_DAILY_ACTIVITIES: NO
ASSISTIVE_DEVICE: EYEGLASSES
MANAGING_FINANCES: TOTAL CARE
PILL_BOX_USED: NO
WALKS IN HOME: INDEPENDENT
HEARING - RIGHT EAR: HEARING AID

## 2024-01-15 ASSESSMENT — ENCOUNTER SYMPTOMS
OCCASIONAL FEELINGS OF UNSTEADINESS: 0
TREMORS: 1
LOSS OF SENSATION IN FEET: 0
DYSPHORIC MOOD: 1
DEPRESSION: 1
DYSURIA: 0
NERVOUS/ANXIOUS: 1
BACK PAIN: 0
ARTHRALGIAS: 1
JOINT SWELLING: 1
DIFFICULTY URINATING: 0
ROS SKIN COMMENTS: THIN SKIN
UNEXPECTED WEIGHT CHANGE: 1
APPETITE CHANGE: 0
AGITATION: 1
DIZZINESS: 0
HALLUCINATIONS: 1
CONFUSION: 1
WEAKNESS: 1
SLEEP DISTURBANCE: 1
LIGHT-HEADEDNESS: 0
TROUBLE SWALLOWING: 0

## 2024-01-15 ASSESSMENT — GERIATRIC DEPRESSION SCALE SHORT VERSION (GDS-SV)
ARE YOU IN GOOD SPIRITS MOST OF THE TIME: YES
HAVE YOU DROPPED MANY OF YOUR ACTIVITIES AND INTERESTS?: NO
DO YOU OFTEN FEEL HELPLESS: NO
DO YOU THINK THAT MOST PEOPLE ARE BETTER OFF THAN YOU ARE: NO
DO YOU FEEL YOU HAVE MORE PROBLEMS WITH MEMORY THAN MOST: YES
DO YOU FEEL FULL OF ENERGY: YES
DO YOU FEEL PRETTY WORTHLESS THE WAY YOU ARE NOW: NO
DO YOU FEEL HAPPY MOST OF THE TIME: YES
DO YOU FEEL THAT YOUR LIFE IS EMPTY: NO
ARE YOU AFRAID THAT SOMETHING BAD IS GOING TO HAPPEN TO YOU: NO
GDS TOTAL SCORE: 1
DO YOU FEEL THAT YOUR SITUATION IS HOPELESS: NO
DO YOU THINK IT IS WONDERFUL TO BE ALIVE NOW: YES
ARE YOU BASICALLY SATISFIED WITH YOUR LIFE: YES
DO YOU OFTEN GET BORED: NO
DO YOU PREFER TO STAY AT HOME, RATHER THAN GOING OUT AND DOING NEW THINGS: NO

## 2024-01-15 ASSESSMENT — PATIENT HEALTH QUESTIONNAIRE - PHQ9
SUM OF ALL RESPONSES TO PHQ9 QUESTIONS 1 AND 2: 2
1. LITTLE INTEREST OR PLEASURE IN DOING THINGS: SEVERAL DAYS
10. IF YOU CHECKED OFF ANY PROBLEMS, HOW DIFFICULT HAVE THESE PROBLEMS MADE IT FOR YOU TO DO YOUR WORK, TAKE CARE OF THINGS AT HOME, OR GET ALONG WITH OTHER PEOPLE: SOMEWHAT DIFFICULT
2. FEELING DOWN, DEPRESSED OR HOPELESS: SEVERAL DAYS

## 2024-01-15 ASSESSMENT — PAIN SCALES - GENERAL: PAINLEVEL: 0-NO PAIN

## 2024-01-15 NOTE — PROGRESS NOTES
Subjective   Patient ID: Priscilla Tran is a 89 y.o. female who presents for confusion and night-time behaviors.       Identifying Information                              : 10/2/1933           Assessment date: 1/15/2024    Objective     Patient accompanied by:  Jennifer, daughter, Mook, son, and daughter-in-law, Betty.        History provided by: family    CONCERNS IDENTIFIED BY NURSING AND SOCIAL WORK     1. Confusion      2. Hallucinations/delusions      Social History                           Social History     Socioeconomic History    Marital status:      Spouse name: None    Number of children: None    Years of education: None    Highest education level: None   Occupational History    None   Tobacco Use    Smoking status: Never    Smokeless tobacco: Never   Substance and Sexual Activity    Alcohol use: Never    Drug use: Never    Sexual activity: Defer   Other Topics Concern    None   Social History Narrative    None     Social Determinants of Health     Financial Resource Strain: Not on file   Food Insecurity: Not on file   Transportation Needs: Not on file   Physical Activity: Not on file   Stress: Not on file   Social Connections: Not on file   Intimate Partner Violence: Not on file   Housing Stability: Not on file        ENCOUNTER SCREENING RESULTS  Blind MoCA completed because Macular Degeneration has impaired vision. MoCA Score was 7/22  GDS Score:1    NURSING ASSESSMENT    ADL Screening  Patient's Vision Adequate to Safely Complete Daily Activities: No  Patient's Judgment Adequate to Safely Complete Daily Activities: No  Patient's Memory Adequate to Safely Complete Daily Activities: No  Patient Able to Express Needs/Desires: Yes  Which is your dominant hand?: Right  Dressing: Independent  Grooming: Independent  Feeding: Independent  Bathing: Needs assistance (Daughter helps with washing her hair because of instability)  Toileting: Independent  In/Out Bed: Independent  Walks in Home:  Independent  Weakness of Legs: Both (carpal tunnel, arthritis)  Weakness of Arms/Hands: None  Hearing - Right Ear: Hearing aid  Hearing - Left Ear: Hearing aid     IADL's  Using Telephone: Needs assistance  Grocery Shopping: Total care  Preparing Meals: Total care  Doing Housework: Needs assistance  Laundry: Total  Taking Medication: Total care  Pill Box Used: No  Managing Finances: Total care  Using Transportation: Total care  Still Driving: No (stopped 6 years ago)  Eating: Independent  Needs Assistance With Food: Independent  Difficulty Chewing or Swallowing: No     Safety Concerns  Safety Concerns: None     Nutrition and Exercise  Current Diet: Well Balanced Diet  Adequate Fluid Intake: Yes  Caffeine: No  Appetite:: Good  Food Consistency:: Regular  Liquids Consistency:: Thin  Changes in Weight?: Yes (10 lbs in couple months (good))  Chewing or Swallowing Problems?: No  Exercise Frequency: Infrequently     SOCIAL WORK ASSESSMENT    Advance Directives/Legal/Financial     DPOA for healthcare:  Jennifer Tran  DPOA for finances:  Jennifer Tran   Legal guardian:  SHAHNAZ     Living Will: no     On any form of disability? no If so, explain:     [place]? no  If so, explain:     Significant financial stressors: none noted    Living Situation      Type of residence: Private Home, ranch with basement but basement blocked off     People living in the home: patient and daughter     Does patient feel safe living in their home? yes     Supportive Relationships (Informal Support)     Spouse/partner information:   33 years.      Children Information:  3 daughters and one son     Other social supports: daughter in law    Formal Supports     Engaged community services (Emergency Alert, HHA, MOW, Case Management, Etc.): NA    Mental Health     Sleep: night-time behaviors disrupting sleep     Energy: okay, fluctuates     Mood: within normal limits     Affect: calm and pleasant     Notable Loss/Grief:    33  years ago     Self-harm/suicidal thoughts, plan: None Reported     Interests/Hobbies/Activities/Daily Routine: assists with chores around the house     History of outpatient psychiatric services: Night-time behaviors being treated by PCP     History of addiction services: NA    Assessment/Plan     Plan: Review team evaluation results and share information/resources as needed.

## 2024-01-15 NOTE — PATIENT INSTRUCTIONS
Thank you for meeting with me today. We discussed the following:     Med changes  - stop the hydroxyzine,it is duplicated by the allegra which is another antihistamine and the anti anxiety effects are minimal   - hold the vit D until we can see what the levels are     For caregivers of patients with Dementia    [facility]'s [facility] Caregiver program  - contact Maite Garvin:    [phone] or [email or web address]    2. [employer]       In addition, here are some general guidelines on brain health, pain control and sleep.   General brain health guidelines:  - Make sure your medical conditions are well controlled (e.g., high blood pressure, high cholesterol, diabetes, sleep apnea etc)  - Do not smoke or chew tobacco  - Limiit alcohol use to no more than 1 alcoholic beverage per day   - Address any sensory deficits (e.g., proper glasses for poor eyesight, hearing aids for hearing loss)  - Use a weekly pill box  - Eat a heart healthy diet (fruits, vegetables, lean meats, fatty fish, whole grains. Limit processed foods)  - Exercise or walk. Gradually increase to the goal of 5 days per week, 30 minutes at a time  - Try to get at least 7 hours of quality sleep per night  - Keep yourself mentally active daily by reading, playing cards, doing word searches, puzzles, etc.  - Challenge your brain with new cognitive tasks (new hobby, crafts, take a class, learn a language)  - Stay socially active by being part of a group or organization    General pain control guidelines  - try to stay ahead of pain by taking your medications sooner rather than later  - Tyelnol is generally a safe medication to take for pain. A general dose is 1000 mg every 6-8 hours; do not exceed 3000 mg or 3 doses in a day. Stay away from formulations that have Benadryl or diphenhydramine in them.   - Lidocaine gel or patch may help to relieve pain. 4% strength is over the counter.  - Voltaren gel 1% may be helpful in  relieving pain. It is available over the counter.   - avoid Nonsteroidal Anti-inflammatories (Nsaids) such as Motrin (ibuprofen), Aleve (naproxen) unless specifically recommended by your provider;  these can cause gastrointestinal and kidney problems.   - Use heat or cold as needed to help with pain.   - Distraction can be helpful.     General sleep guidelines  - Avoid over the counter sleep preparations with diphenhydramine or Benadryl. This medicine can cause confusion and increase risk of falls.   - Do not use alcohol to go to sleep.   - Melatonin is generally safe to try, start with 1-3 mg a couple of hours before sleep.   - Avoid all caffeine after 12 noon. Look for hidden sources such as chocolate.   - Try an herbal tea like chamomile or Sleepytime before bed.  - Turn off the TV or set a timer so it goes off.   - Establish a bedtime routine to tell your body it is time to sleep. It can be some relaxing music, reading a book, taking a shower, prayer/meditation, etc.       Please follow up with us in return to clinic: 4-6 weeks for summary visit   If the weather is bad on the day of your next appointment, it can be changed to a virtual visit. Please call the office on the day of the visit and ask them to change it to a virtual visit.

## 2024-01-15 NOTE — PROGRESS NOTES
Subjective   Ms. Tran 89 y.o. year old female is accompanied by: son Mook (son) , Maite (dil), Jennifer (gladis).   Consult Requested By: patient's primary care physician, Jan Cowan MD   Reason for consultation or primary concern: New Patient Visit    HPI   - dx dementia 2021, did well but in June 2023 significant change with more confusion, paranoid, but no follow up done immediately, eventually saw PCP who put her on sertraline did not tolerate (more confused), then ativan and eventually started on trazodone. Pt now still very confused, inappropriate behaviors at night (pillow in house are people, others live there, dating her son, etc), gets tearful easily. Was referred to geriatrics by the PCP.  Has April appt with Dr. Orozco.   - macular degeneration limits her but she is as independent as she can be, also has OA and carpal tunnel.   - insomnia- sleeping better after increase to 50 mg but still wakes early 0200 emelina, goes to bed about 2000  -takes tramadol 50 mg bid and 25 mg midday for OA, had significant relief when she got a 5 mg prednisone the other day.   - has been high dose D long term, no labs   - just recently started on hydroxyzine for anxiety but also on allegra; hx of use of ativan in Dec  - stable on her warfarin      Per patient and family (obtained in addition due to cogntion)  Family history   No family history on file.     Social history   Has total 4 children all in area   Marital status:  x33 yrs   Tobacco use: none  Alcohol use: none  Illicit drugs: No  Occupation:retiredbank teller and admin office work  Home environment  [place] has basement, Gladis Jennifer lives with her there  Is dependent or requires assistance in the following BADL: Ind to supervision   Is dependent or requires assistance in the following Instrumental ADL: gladis does all this   Use of med box: No  Advanced Directives on file: Jennifer andrews is POA    Review of Systems   Constitutional:  Positive for  unexpected weight change (gain 10#). Negative for appetite change.   HENT:  Positive for hearing loss. Negative for dental problem and trouble swallowing.    Eyes:  Positive for visual disturbance (macular).   Cardiovascular:  Positive for leg swelling.   Genitourinary:  Negative for difficulty urinating and dysuria.   Musculoskeletal:  Positive for arthralgias, gait problem (uses rollator all time) and joint swelling (knees). Negative for back pain.   Skin:         Thin skin   Neurological:  Positive for tremors (has head tremor) and weakness (general, occ). Negative for dizziness and light-headedness.   Psychiatric/Behavioral:  Positive for agitation (loud noises bother her, can be dismissive if bombarded), behavioral problems, confusion, dysphoric mood (in evenings), hallucinations (more in evening) and sleep disturbance. The patient is nervous/anxious.      Visual: glasses Yes Last exam: 2023  Hearing: hearing aides Yes Last exam: 2022  Dental: dentures No Last exam: 2023 has implants    Current Outpatient Medications:     acetaminophen (Tylenol) 500 mg capsule, Take 1 capsule (500 mg) by mouth every 6 hours if needed., Disp: , Rfl:     amLODIPine (Norvasc) 2.5 mg tablet, Take 1 tablet (2.5 mg) by mouth once daily., Disp: , Rfl:     ascorbic acid (Vitamin C) 500 mg tablet, Take 1 tablet (500 mg) by mouth once daily., Disp: , Rfl:     calcium carbonate-vitamin D3 (Oysco 500/D) 500 mg-5 mcg (200 unit) tablet, Take 1 tablet by mouth 2 times a day with meals., Disp: , Rfl:     celecoxib (CeleBREX) 100 mg capsule, Take 1 capsule (100 mg) by mouth 2 times a day. 100 q am and 200 mg q pm, Disp: , Rfl:     ergocalciferol (Vitamin D-2) 1.25 MG (84024 UT) capsule, Take 1 capsule (1,250 mcg) by mouth 1 (one) time per week., Disp: , Rfl:     fexofenadine (Allegra) 180 mg tablet, Take 1 tablet (180 mg) by mouth once daily., Disp: , Rfl:     furosemide (Lasix) 40 mg tablet, Take 1 tablet (40 mg) by mouth once daily., Disp: ,  Rfl:     hydrOXYzine HCL (Atarax) 25 mg tablet, Take 1 tablet (25 mg) by mouth once daily as needed for anxiety., Disp: , Rfl:     metoprolol succinate XL (Toprol-XL) 25 mg 24 hr tablet, Take 1 tablet (25 mg) by mouth once daily., Disp: , Rfl:     traMADol (Ultram) 50 mg tablet, Take 1 tablet (50 mg) by mouth every 6 hours if needed., Disp: , Rfl:     traZODone (Desyrel) 50 mg tablet, Take 1 tablet (50 mg) by mouth once daily at bedtime. 1/2 tab, Disp: , Rfl:     vit C/E/Zn/coppr/lutein/zeaxan (PRESERVISION AREDS-2 ORAL), Take by mouth., Disp: , Rfl:     warfarin (Coumadin) 5 mg tablet, Take 1 tablet (5 mg) by mouth once daily. As directed, Disp: , Rfl:     diclofenac sodium (Voltaren) 1 % gel gel, Apply 1 Application topically 4 times a day., Disp: 100 g, Rfl: 1    memantine (Namenda) 5 mg tablet, Take 1 tablet (5 mg) by mouth once daily., Disp: 30 tablet, Rfl: 11     Objective   /82   Pulse 76   Temp 37.1 °C (98.8 °F) (Tympanic)   Resp 18   Wt 66.9 kg (147 lb 6.4 oz)   BMI 24.53 kg/m²   Admission on 12/26/2023, Discharged on 12/26/2023   Component Date Value Ref Range Status    Ventricular Rate 12/26/2023 62  BPM Final    QRS Duration 12/26/2023 68  ms Final    QT Interval 12/26/2023 416  ms Final    QTC Calculation(Bazett) 12/26/2023 422  ms Final    R Axis 12/26/2023 80  degrees Final    T Axis 12/26/2023 60  degrees Final    QRS Count 12/26/2023 11  beats Final    Q Onset 12/26/2023 225  ms Final    T Offset 12/26/2023 433  ms Final    QTC Fredericia 12/26/2023 420  ms Final    Color, Urine 12/26/2023 Light-Yellow  Light-Yellow, Yellow, Dark-Yellow Final    Appearance, Urine 12/26/2023 Clear  Clear Final    Specific Gravity, Urine 12/26/2023 1.021  1.005 - 1.035 Final    pH, Urine 12/26/2023 6.5  5.0, 5.5, 6.0, 6.5, 7.0, 7.5, 8.0 Final    Protein, Urine 12/26/2023 10 (TRACE)  NEGATIVE, 10 (TRACE), 20 (TRACE) mg/dL Final    Glucose, Urine 12/26/2023 Normal  Normal mg/dL Final    Blood, Urine  12/26/2023 NEGATIVE  NEGATIVE Final    Ketones, Urine 12/26/2023 NEGATIVE  NEGATIVE mg/dL Final    Bilirubin, Urine 12/26/2023 NEGATIVE  NEGATIVE Final    Urobilinogen, Urine 12/26/2023 Normal  Normal mg/dL Final    Nitrite, Urine 12/26/2023 NEGATIVE  NEGATIVE Final    Leukocyte Esterase, Urine 12/26/2023 NEGATIVE  NEGATIVE Final    WBC 12/26/2023 8.0  4.4 - 11.3 x10*3/uL Final    nRBC 12/26/2023 0.0  0.0 - 0.0 /100 WBCs Final    RBC 12/26/2023 4.70  4.00 - 5.20 x10*6/uL Final    Hemoglobin 12/26/2023 13.5  12.0 - 16.0 g/dL Final    Hematocrit 12/26/2023 43.2  36.0 - 46.0 % Final    MCV 12/26/2023 92  80 - 100 fL Final    MCH 12/26/2023 28.7  26.0 - 34.0 pg Final    MCHC 12/26/2023 31.3 (L)  32.0 - 36.0 g/dL Final    RDW 12/26/2023 13.2  11.5 - 14.5 % Final    Platelets 12/26/2023 218  150 - 450 x10*3/uL Final    Neutrophils % 12/26/2023 59.8  40.0 - 80.0 % Final    Immature Granulocytes %, Automated 12/26/2023 0.4  0.0 - 0.9 % Final    Lymphocytes % 12/26/2023 28.8  13.0 - 44.0 % Final    Monocytes % 12/26/2023 6.7  2.0 - 10.0 % Final    Eosinophils % 12/26/2023 3.4  0.0 - 6.0 % Final    Basophils % 12/26/2023 0.9  0.0 - 2.0 % Final    Neutrophils Absolute 12/26/2023 4.77  1.60 - 5.50 x10*3/uL Final    Immature Granulocytes Absolute, Au* 12/26/2023 0.03  0.00 - 0.50 x10*3/uL Final    Lymphocytes Absolute 12/26/2023 2.29  0.80 - 3.00 x10*3/uL Final    Monocytes Absolute 12/26/2023 0.53  0.05 - 0.80 x10*3/uL Final    Eosinophils Absolute 12/26/2023 0.27  0.00 - 0.40 x10*3/uL Final    Basophils Absolute 12/26/2023 0.07  0.00 - 0.10 x10*3/uL Final    Glucose 12/26/2023 85  65 - 99 mg/dL Final    Sodium 12/26/2023 139  133 - 145 mmol/L Final    Potassium 12/26/2023 4.7  3.4 - 5.1 mmol/L Final    Chloride 12/26/2023 103  97 - 107 mmol/L Final    Bicarbonate 12/26/2023 25  24 - 31 mmol/L Final    Urea Nitrogen 12/26/2023 21  8 - 25 mg/dL Final    Creatinine 12/26/2023 0.60  0.40 - 1.60 mg/dL Final    eGFR 12/26/2023 85   >60 mL/min/1.73m*2 Final    Calcium 12/26/2023 8.8  8.5 - 10.4 mg/dL Final    Albumin 12/26/2023 4.0  3.5 - 5.0 g/dL Final    Alkaline Phosphatase 12/26/2023 89  35 - 125 U/L Final    Total Protein 12/26/2023 7.0  5.9 - 7.9 g/dL Final    AST 12/26/2023 25  5 - 40 U/L Final    Bilirubin, Total 12/26/2023 0.6  0.1 - 1.2 mg/dL Final    ALT 12/26/2023 12  5 - 40 U/L Final    Anion Gap 12/26/2023 11  <=19 mmol/L Final    Lipase 12/26/2023 41  16 - 63 U/L Final    Coronavirus 2019, PCR 12/26/2023 Not Detected  Not Detected Final    Troponin T, High Sensitivity 12/26/2023 11  <=15 ng/L Final    WBC, Urine 12/26/2023 1-5  1-5, NONE /HPF Final    RBC, Urine 12/26/2023 3-5  NONE, 1-2, 3-5 /HPF Final    Squamous Epithelial Cells, Urine 12/26/2023 1-9 (SPARSE)  Reference range not established. /HPF Final    Mucus, Urine 12/26/2023 FEW  Reference range not established. /LPF Final    Flu A Result 12/26/2023 Not Detected  Not Detected Final    Flu B Result 12/26/2023 Not Detected  Not Detected Final   Admission on 12/11/2023, Discharged on 12/11/2023   Component Date Value Ref Range Status    Ventricular Rate 12/11/2023 65  BPM Final    QRS Duration 12/11/2023 82  ms Final    QT Interval 12/11/2023 426  ms Final    QTC Calculation(Bazett) 12/11/2023 443  ms Final    R Axis 12/11/2023 61  degrees Final    T Axis 12/11/2023 57  degrees Final    QRS Count 12/11/2023 10  beats Final    Q Onset 12/11/2023 225  ms Final    T Offset 12/11/2023 438  ms Final    QTC Fredericia 12/11/2023 437  ms Final    Glucose 12/11/2023 90  65 - 99 mg/dL Final    Sodium 12/11/2023 141  133 - 145 mmol/L Final    Potassium 12/11/2023 4.2  3.4 - 5.1 mmol/L Final    Chloride 12/11/2023 105  97 - 107 mmol/L Final    Bicarbonate 12/11/2023 26  24 - 31 mmol/L Final    Urea Nitrogen 12/11/2023 22  8 - 25 mg/dL Final    Creatinine 12/11/2023 0.60  0.40 - 1.60 mg/dL Final    eGFR 12/11/2023 85  >60 mL/min/1.73m*2 Final    Calcium 12/11/2023 8.6  8.5 - 10.4 mg/dL  Final    Anion Gap 12/11/2023 10  <=19 mmol/L Final    WBC 12/11/2023 8.0  4.4 - 11.3 x10*3/uL Final    nRBC 12/11/2023 0.0  0.0 - 0.0 /100 WBCs Final    RBC 12/11/2023 4.50  4.00 - 5.20 x10*6/uL Final    Hemoglobin 12/11/2023 13.1  12.0 - 16.0 g/dL Final    Hematocrit 12/11/2023 41.9  36.0 - 46.0 % Final    MCV 12/11/2023 93  80 - 100 fL Final    MCH 12/11/2023 29.1  26.0 - 34.0 pg Final    MCHC 12/11/2023 31.3 (L)  32.0 - 36.0 g/dL Final    RDW 12/11/2023 13.4  11.5 - 14.5 % Final    Platelets 12/11/2023 172  150 - 450 x10*3/uL Final    Neutrophils % 12/11/2023 62.0  40.0 - 80.0 % Final    Immature Granulocytes %, Automated 12/11/2023 0.3  0.0 - 0.9 % Final    Lymphocytes % 12/11/2023 27.9  13.0 - 44.0 % Final    Monocytes % 12/11/2023 6.6  2.0 - 10.0 % Final    Eosinophils % 12/11/2023 2.4  0.0 - 6.0 % Final    Basophils % 12/11/2023 0.8  0.0 - 2.0 % Final    Neutrophils Absolute 12/11/2023 4.95  1.60 - 5.50 x10*3/uL Final    Immature Granulocytes Absolute, Au* 12/11/2023 0.02  0.00 - 0.50 x10*3/uL Final    Lymphocytes Absolute 12/11/2023 2.22  0.80 - 3.00 x10*3/uL Final    Monocytes Absolute 12/11/2023 0.53  0.05 - 0.80 x10*3/uL Final    Eosinophils Absolute 12/11/2023 0.19  0.00 - 0.40 x10*3/uL Final    Basophils Absolute 12/11/2023 0.06  0.00 - 0.10 x10*3/uL Final    Troponin T, High Sensitivity 12/11/2023 10  <=15 ng/L Final    Troponin T, High Sensitivity 12/11/2023 10  <=15 ng/L Final        Physical Exam  Vitals reviewed.   Constitutional:       General: She is not in acute distress.     Appearance: She is ill-appearing (chronically ill).      Comments: NAD   HENT:      Head: Normocephalic and atraumatic.      Comments: Hearing aides but still needs extra time and loud voice to hear     Mouth/Throat:      Mouth: Mucous membranes are dry.      Pharynx: Oropharynx is clear.   Eyes:      Conjunctiva/sclera: Conjunctivae normal.   Cardiovascular:      Rate and Rhythm: Normal rate and regular rhythm.       Pulses: Normal pulses.      Heart sounds: Normal heart sounds. No murmur heard.     No friction rub. No gallop.      Comments: But hx of afib  Pulmonary:      Effort: Pulmonary effort is normal. No respiratory distress.      Breath sounds: Normal breath sounds. No wheezing, rhonchi or rales.   Abdominal:      General: Abdomen is flat. Bowel sounds are normal.      Palpations: Abdomen is soft.   Musculoskeletal:         General: Swelling present.      Cervical back: Normal range of motion and neck supple.      Right knee: Swelling, deformity and crepitus present. Decreased range of motion.      Left knee: Swelling, deformity and crepitus present. Decreased range of motion.   Skin:     General: Skin is warm and dry.      Capillary Refill: Capillary refill takes less than 2 seconds.   Neurological:      General: No focal deficit present.      Mental Status: She is alert. She is disoriented and confused.      Motor: Motor function is intact.      Gait: Gait abnormal (rollator with assist).   Psychiatric:         Attention and Perception: She is inattentive.         Mood and Affect: Mood is anxious. Affect is flat.         Speech: Speech is delayed.         Behavior: Behavior is cooperative.         Cognition and Memory: Cognition is impaired. Memory is impaired.         Judgment: Judgment is inappropriate.       MoCA: 7/22 blinded   PHQ9 14  NPI: + to all x [email or web address]     Assessment/Plan      1. Moderate Alzheimer's dementia with psychotic disturbance, unspecified timing of dementia onset (CMS/[facility])  2. Confusion  3. Anxiety, sundowning   - check labs including tsh   - not on any meds for dementia, will start memantine (Namenda) 5 mg tablet and see if any improvement before moving to other meds   - - tried zoloft in past without success, low dose per darryl and had paradoxical effect; also was given ativan in Dec. Now taking hydroxyzine. Will stop hydroxyzine;  could consider use of escitalopram and may need low dose  seroquel (black box warning discussed, last Qtc [phone] [id number]  - has appt w Dr. Adkins in April   - no mental health hx   - discussed that sundowing and delirium events need careful investigation, her behaviors may be related to pain and sleep.   - encourage \[facility]\"" association for family, darryl who is primary caregiver documented mild and mod distress over care.   - stressed need for routine    4. Insomnia, unspecified type  - continue trazodone at hs  - suggest staying up a little later than 8 pm so she sleeps later in day     5. Polypharmacy  - on hydroxyzine- will stop due to anticholingeric effects and duplicate antihistamine effect with allegra  - on celebrex, chronic, not clear if gets much pain relief from this, could consider stopping , tramadol prn   - Vit D dose is high on weekly dosing, will check levels     6. Primary osteoarthritis of both knees, chronic pain   - continue voltaren gel, optimize frequency for best results  - consider stopping celebrex and starting RTC tylenol with tramadol for breakthru pain   - heat.,cold  - rec use of lidocaine patches to knees  - just had injection done     7. Primary hypertension  - stable on current meds     8. Vitamin D deficiency  - hold vit D for now   - Vitamin D 25-Hydroxy,Total (for eval of Vitamin D levels)    9. B12 deficiency  - check level     10. Bilateral hearing loss, unspecified hearing loss type  - has aides, unclear if working well, will impact dementia      11.AF with long term AC  - does well on warfarin, will continue this plan for now.  Has had no falls.     12.  HM  - need to discuss RSV and covid booster vaccination, unclear if received Did get flu in July 2023.   - may also need PCV 20, only see pcv 13 done in past  - no documentation of shingrix  - mammo, would defer due to age and pt understanding of discomfort procedrue  - colon would defer due to age    Dispo: return in 4-6 weeks for fu appt, labs today, call darryl with results. Will start on  memantine for now and stop hydroxyzine and Vit D. When follow up done, consider stopping celebrex and use tyelnol RTC aand tramadol prn along with non oral meds.     Seema Spence, APRN-CNP

## 2024-02-11 ENCOUNTER — APPOINTMENT (OUTPATIENT)
Dept: RADIOLOGY | Facility: HOSPITAL | Age: 89
End: 2024-02-11
Payer: MEDICARE

## 2024-02-11 ENCOUNTER — HOSPITAL ENCOUNTER (EMERGENCY)
Facility: HOSPITAL | Age: 89
Discharge: HOME | End: 2024-02-11
Attending: STUDENT IN AN ORGANIZED HEALTH CARE EDUCATION/TRAINING PROGRAM
Payer: MEDICARE

## 2024-02-11 VITALS
WEIGHT: 148 LBS | TEMPERATURE: 97.7 F | HEIGHT: 64 IN | DIASTOLIC BLOOD PRESSURE: 68 MMHG | OXYGEN SATURATION: 96 % | SYSTOLIC BLOOD PRESSURE: 135 MMHG | BODY MASS INDEX: 25.27 KG/M2 | RESPIRATION RATE: 11 BRPM | HEART RATE: 58 BPM

## 2024-02-11 DIAGNOSIS — S09.90XA HEAD INJURY, INITIAL ENCOUNTER: ICD-10-CM
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
## 2024-02-11 LAB
ANION GAP SERPL CALC-SCNC: 9 MMOL/L
BASOPHILS # BLD AUTO: 0.06 X10*3/UL (ref 0–0.1)
BASOPHILS NFR BLD AUTO: 0.9 %
BUN SERPL-MCNC: 22 MG/DL (ref 8–25)
CALCIUM SERPL-MCNC: 8.6 MG/DL (ref 8.5–10.4)
CHLORIDE SERPL-SCNC: 104 MMOL/L (ref 97–107)
CO2 SERPL-SCNC: 28 MMOL/L (ref 24–31)
CREAT SERPL-MCNC: 0.7 MG/DL (ref 0.4–1.6)
EGFRCR SERPLBLD CKD-EPI 2021: 82 ML/MIN/1.73M*2
EOSINOPHIL # BLD AUTO: 0.29 X10*3/UL (ref 0–0.4)
EOSINOPHIL NFR BLD AUTO: 4.2 %
ERYTHROCYTE [DISTWIDTH] IN BLOOD BY AUTOMATED COUNT: 13.3 % (ref 11.5–14.5)
GLUCOSE SERPL-MCNC: 86 MG/DL (ref 65–99)
HCT VFR BLD AUTO: 39.5 % (ref 36–46)
HGB BLD-MCNC: 12.7 G/DL (ref 12–16)
IMM GRANULOCYTES # BLD AUTO: 0.09 X10*3/UL (ref 0–0.5)
IMM GRANULOCYTES NFR BLD AUTO: 1.3 % (ref 0–0.9)
INR PPP: 2.1 (ref 0.9–1.2)
LYMPHOCYTES # BLD AUTO: 2.11 X10*3/UL (ref 0.8–3)
LYMPHOCYTES NFR BLD AUTO: 30.8 %
MCH RBC QN AUTO: 29.7 PG (ref 26–34)
MCHC RBC AUTO-ENTMCNC: 32.2 G/DL (ref 32–36)
MCV RBC AUTO: 92 FL (ref 80–100)
MONOCYTES # BLD AUTO: 0.24 X10*3/UL (ref 0.05–0.8)
MONOCYTES NFR BLD AUTO: 3.5 %
NEUTROPHILS # BLD AUTO: 4.06 X10*3/UL (ref 1.6–5.5)
NEUTROPHILS NFR BLD AUTO: 59.3 %
NRBC BLD-RTO: 0 /100 WBCS (ref 0–0)
PLATELET # BLD AUTO: 181 X10*3/UL (ref 150–450)
POTASSIUM SERPL-SCNC: 3.7 MMOL/L (ref 3.4–5.1)
PROTHROMBIN TIME: 20.9 SECONDS (ref 9.3–12.7)
RBC # BLD AUTO: 4.28 X10*6/UL (ref 4–5.2)
SODIUM SERPL-SCNC: 141 MMOL/L (ref 133–145)
WBC # BLD AUTO: 6.9 X10*3/UL (ref 4.4–11.3)

## 2024-02-11 PROCEDURE — 85025 COMPLETE CBC W/AUTO DIFF WBC: CPT | Performed by: STUDENT IN AN ORGANIZED HEALTH CARE EDUCATION/TRAINING PROGRAM

## 2024-02-11 PROCEDURE — 99285 EMERGENCY DEPT VISIT HI MDM: CPT | Mod: 25 | Performed by: STUDENT IN AN ORGANIZED HEALTH CARE EDUCATION/TRAINING PROGRAM

## 2024-02-11 PROCEDURE — 72125 CT NECK SPINE W/O DYE: CPT

## 2024-02-11 PROCEDURE — 85610 PROTHROMBIN TIME: CPT | Performed by: STUDENT IN AN ORGANIZED HEALTH CARE EDUCATION/TRAINING PROGRAM

## 2024-02-11 PROCEDURE — 70450 CT HEAD/BRAIN W/O DYE: CPT

## 2024-02-11 PROCEDURE — 36415 COLL VENOUS BLD VENIPUNCTURE: CPT | Performed by: STUDENT IN AN ORGANIZED HEALTH CARE EDUCATION/TRAINING PROGRAM

## 2024-02-11 PROCEDURE — 99285 EMERGENCY DEPT VISIT HI MDM: CPT | Mod: 25,27

## 2024-02-11 PROCEDURE — 80048 BASIC METABOLIC PNL TOTAL CA: CPT | Performed by: STUDENT IN AN ORGANIZED HEALTH CARE EDUCATION/TRAINING PROGRAM

## 2024-02-11 ASSESSMENT — LIFESTYLE VARIABLES
HAVE PEOPLE ANNOYED YOU BY CRITICIZING YOUR DRINKING: NO
EVER FELT BAD OR GUILTY ABOUT YOUR DRINKING: NO
EVER HAD A DRINK FIRST THING IN THE MORNING TO STEADY YOUR NERVES TO GET RID OF A HANGOVER: NO
HAVE YOU EVER FELT YOU SHOULD CUT DOWN ON YOUR DRINKING: NO

## 2024-02-11 ASSESSMENT — PAIN SCALES - GENERAL
PAINLEVEL_OUTOF10: 0 - NO PAIN

## 2024-02-11 ASSESSMENT — COLUMBIA-SUICIDE SEVERITY RATING SCALE - C-SSRS
1. IN THE PAST MONTH, HAVE YOU WISHED YOU WERE DEAD OR WISHED YOU COULD GO TO SLEEP AND NOT WAKE UP?: NO
6. HAVE YOU EVER DONE ANYTHING, STARTED TO DO ANYTHING, OR PREPARED TO DO ANYTHING TO END YOUR LIFE?: NO
2. HAVE YOU ACTUALLY HAD ANY THOUGHTS OF KILLING YOURSELF?: NO

## 2024-02-11 ASSESSMENT — PAIN - FUNCTIONAL ASSESSMENT: PAIN_FUNCTIONAL_ASSESSMENT: 0-10

## 2024-02-11 NOTE — ED PROVIDER NOTES
Supervisory note:  Patient seen in conjunction with SUBHASH Bateman.  Patient presents after a fall.  Her daughter heard her going to the bathroom around 7:00 this morning.  She then heard her fall.  She reports that there was a chunk of hair which had been torn out after patient apparently hit an object on the wall.  Patient does take Coumadin.  Patient does not remember the episode.  Her daughter states this is normal for her with her history of dementia.  Patient denies any pain at this time.  On examination, there is no tenderness to palpation of the head, neck, shoulders, abdomen, or hips.  Patient is able to lift arms and legs off bed to command.    Laboratory studies unremarkable.  CAT scan without acute findings.  CT neck compared with previous CT with similar abnormal findings.  Patient's daughter advised of these findings and of need for follow-up with primary care.  Return precautions given for any worsening symptoms.    I personally saw the patient and made/approved the management plan and take responsiblity for the patient management.  Parts of this chart were completed with dictation software, please excuse any errors in transcription.     John Ibarra MD  02/11/24 0870

## 2024-02-11 NOTE — DISCHARGE INSTRUCTIONS
You should follow-up with your primary care physician regarding incidental finding seen on your CAT scan.  Return to the emergency department with any worsening symptoms.    It is important to remember that your care does not end here and you must continue to monitor your condition closely. Please return to the emergency department for any worsening or concerning signs or symptoms as directed by our conversations and the discharge instructions. If you do not have a doctor please contact the referral number on your discharge instructions. Please contact any physician specialists provided in your discharge notes as it is very important to follow up with them regarding your condition. If you are unable to reach the physicians provided, please come back to the Emergency Department at any time.    Return to emergency room without delay for ANY new or worsening pains or for any other symptoms or concerns.  Return with worsening pains, nausea, vomiting, trouble breathing, palpitations, shortness of breath, inability to pass stool or urine, loss of control of stool or urine, any numbness or tingling (that is not normal for you), uncontrolled fevers, the passing of blood or other material in stool or urine, rashes, pains or for any other symptoms or concerns you may have.  You are always welcome to return to the ER at any time for any reason or for any other concerns you may have.
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
## 2024-02-12 ENCOUNTER — OFFICE VISIT (OUTPATIENT)
Dept: GERIATRIC MEDICINE | Facility: CLINIC | Age: 89
End: 2024-02-12
Payer: MEDICARE

## 2024-02-12 VITALS
SYSTOLIC BLOOD PRESSURE: 116 MMHG | TEMPERATURE: 99.3 F | BODY MASS INDEX: 26.35 KG/M2 | RESPIRATION RATE: 14 BRPM | DIASTOLIC BLOOD PRESSURE: 63 MMHG | HEART RATE: 79 BPM | WEIGHT: 153.5 LBS

## 2024-02-12 DIAGNOSIS — Z79.899 POLYPHARMACY: Primary | ICD-10-CM

## 2024-02-12 DIAGNOSIS — R41.0 CONFUSION: ICD-10-CM

## 2024-02-12 DIAGNOSIS — F02.B2 MODERATE ALZHEIMER'S DEMENTIA WITH PSYCHOTIC DISTURBANCE, UNSPECIFIED TIMING OF DEMENTIA ONSET (MULTI): ICD-10-CM

## 2024-02-12 DIAGNOSIS — G30.9 MODERATE ALZHEIMER'S DEMENTIA WITH PSYCHOTIC DISTURBANCE, UNSPECIFIED TIMING OF DEMENTIA ONSET (MULTI): ICD-10-CM

## 2024-02-12 DIAGNOSIS — M17.0 PRIMARY OSTEOARTHRITIS OF BOTH KNEES: ICD-10-CM

## 2024-02-12 PROCEDURE — 3074F SYST BP LT 130 MM HG: CPT | Performed by: NURSE PRACTITIONER

## 2024-02-12 PROCEDURE — 3078F DIAST BP <80 MM HG: CPT | Performed by: NURSE PRACTITIONER

## 2024-02-12 PROCEDURE — 1160F RVW MEDS BY RX/DR IN RCRD: CPT | Performed by: NURSE PRACTITIONER

## 2024-02-12 PROCEDURE — 1159F MED LIST DOCD IN RCRD: CPT | Performed by: NURSE PRACTITIONER

## 2024-02-12 PROCEDURE — 1036F TOBACCO NON-USER: CPT | Performed by: NURSE PRACTITIONER

## 2024-02-12 PROCEDURE — 99215 OFFICE O/P EST HI 40 MIN: CPT | Performed by: NURSE PRACTITIONER

## 2024-02-12 PROCEDURE — 1157F ADVNC CARE PLAN IN RCRD: CPT | Performed by: NURSE PRACTITIONER

## 2024-02-12 PROCEDURE — 1125F AMNT PAIN NOTED PAIN PRSNT: CPT | Performed by: NURSE PRACTITIONER

## 2024-02-12 RX ORDER — MEMANTINE HYDROCHLORIDE 5 MG/1
5 TABLET ORAL 2 TIMES DAILY
Qty: 180 TABLET | Refills: 0 | Status: SHIPPED | OUTPATIENT
Start: 2024-02-12 | End: 2024-05-20 | Stop reason: DRUGHIGH

## 2024-02-12 RX ORDER — ACETAMINOPHEN 500 MG/1
1000 CAPSULE, LIQUID FILLED ORAL EVERY 8 HOURS
Qty: 30 CAPSULE | Refills: 0 | Status: SHIPPED | OUTPATIENT
Start: 2024-02-12

## 2024-02-12 ASSESSMENT — PATIENT HEALTH QUESTIONNAIRE - PHQ9
SUM OF ALL RESPONSES TO PHQ9 QUESTIONS 1 AND 2: 0
1. LITTLE INTEREST OR PLEASURE IN DOING THINGS: NOT AT ALL
2. FEELING DOWN, DEPRESSED OR HOPELESS: NOT AT ALL

## 2024-02-12 ASSESSMENT — ENCOUNTER SYMPTOMS
APPETITE CHANGE: 0
ARTHRALGIAS: 1
UNEXPECTED WEIGHT CHANGE: 0
CONSTIPATION: 1
SLEEP DISTURBANCE: 1
TROUBLE SWALLOWING: 0
BACK PAIN: 1
OCCASIONAL FEELINGS OF UNSTEADINESS: 1
WEAKNESS: 0
ANAL BLEEDING: 1
SHORTNESS OF BREATH: 0
DEPRESSION: 0
LIGHT-HEADEDNESS: 0
LOSS OF SENSATION IN FEET: 0
DYSURIA: 0
ACTIVITY CHANGE: 0
HALLUCINATIONS: 1

## 2024-02-12 ASSESSMENT — PAIN SCALES - GENERAL: PAINLEVEL: 8

## 2024-02-12 NOTE — PROGRESS NOTES
Subjective   Ms. Tran is 89 y.o. year old female and here for   Chief Complaint   Patient presents with    Follow-up    Summary visit     Here with darryl .    Last visit [id number]  Per pt discussion/summary: note reviewed-stop hydrozyzine and Vit D, start memantine, consider stop of celebrex and change to tylenol an tramadol     Interim events:  [id number] fall at home, seen in ER, not admitted.     HPI   Per patient and caregiver (obtained in addition due to cogntion)  - only used hydroxyzine 1-2x since last visit, when very anxious  - darryl has been doing memantine daily mostly and started bid last night, thinks it is starting to help bc noticing more happy, wants to be around people again and do things. Better able to distinguish that she is not dating/ to her son.     Home environment:  ranch home, darryl Rodriguez lives with her  Alcohol:No  Smoking:none  Is dependent or requires assistance in the following BADL: ind to supervision   Is dependent or requires assistance in the following Instrumental ADL: dependent, darryl does all     History of Abuse/Neglect/exploitation: no     Advanced Directives on file:Jennifer is POA    Review of Systems   Constitutional:  Negative for activity change (brain activites daily), appetite change and unexpected weight change.   HENT:  Negative for trouble swallowing.    Respiratory:  Negative for shortness of breath.    Cardiovascular:  Positive for leg swelling. Negative for chest pain.   Gastrointestinal:  Positive for anal bleeding (light ly from hems) and constipation (lax and prep H).   Genitourinary:  Positive for urgency (chronic). Negative for dysuria.   Musculoskeletal:  Positive for arthralgias and back pain.   Neurological:  Negative for weakness and light-headedness.   Psychiatric/Behavioral:  Positive for hallucinations and sleep disturbance (trazodone used, still using bathroom 3-4x per night).        Objective   /63   Pulse 79   Temp 37.4 °C (99.3 °F) (Tympanic)    Resp 14   Wt 69.6 kg (153 lb 8 oz)   BMI 26.35 kg/m²   Admission on 02/11/2024, Discharged on 02/11/2024   Component Date Value Ref Range Status    WBC 02/11/2024 6.9  4.4 - 11.3 x10*3/uL Final    nRBC 02/11/2024 0.0  0.0 - 0.0 /100 WBCs Final    RBC 02/11/2024 4.28  4.00 - 5.20 x10*6/uL Final    Hemoglobin 02/11/2024 12.7  12.0 - 16.0 g/dL Final    Hematocrit 02/11/2024 39.5  36.0 - 46.0 % Final    MCV 02/11/2024 92  80 - 100 fL Final    MCH 02/11/2024 29.7  26.0 - 34.0 pg Final    MCHC 02/11/2024 32.2  32.0 - 36.0 g/dL Final    RDW 02/11/2024 13.3  11.5 - 14.5 % Final    Platelets 02/11/2024 181  150 - 450 x10*3/uL Final    Neutrophils % 02/11/2024 59.3  40.0 - 80.0 % Final    Immature Granulocytes %, Automated 02/11/2024 1.3 (H)  0.0 - 0.9 % Final    Lymphocytes % 02/11/2024 30.8  13.0 - 44.0 % Final    Monocytes % 02/11/2024 3.5  2.0 - 10.0 % Final    Eosinophils % 02/11/2024 4.2  0.0 - 6.0 % Final    Basophils % 02/11/2024 0.9  0.0 - 2.0 % Final    Neutrophils Absolute 02/11/2024 4.06  1.60 - 5.50 x10*3/uL Final    Immature Granulocytes Absolute, Au* 02/11/2024 0.09  0.00 - 0.50 x10*3/uL Final    Lymphocytes Absolute 02/11/2024 2.11  0.80 - 3.00 x10*3/uL Final    Monocytes Absolute 02/11/2024 0.24  0.05 - 0.80 x10*3/uL Final    Eosinophils Absolute 02/11/2024 0.29  0.00 - 0.40 x10*3/uL Final    Basophils Absolute 02/11/2024 0.06  0.00 - 0.10 x10*3/uL Final    Glucose 02/11/2024 86  65 - 99 mg/dL Final    Sodium 02/11/2024 141  133 - 145 mmol/L Final    Potassium 02/11/2024 3.7  3.4 - 5.1 mmol/L Final    Chloride 02/11/2024 104  97 - 107 mmol/L Final    Bicarbonate 02/11/2024 28  24 - 31 mmol/L Final    Urea Nitrogen 02/11/2024 22  8 - 25 mg/dL Final    Creatinine 02/11/2024 0.70  0.40 - 1.60 mg/dL Final    eGFR 02/11/2024 82  >60 mL/min/1.73m*2 Final    Calcium 02/11/2024 8.6  8.5 - 10.4 mg/dL Final    Anion Gap 02/11/2024 9  <=19 mmol/L Final    Protime 02/11/2024 20.9 (H)  9.3 - 12.7 seconds Final    INR  02/11/2024 2.1 (H)  0.9 - 1.2 Final   Office Visit on 01/15/2024   Component Date Value Ref Range Status    Vitamin D, 25-Hydroxy, Total 01/15/2024 99  30 - 100 ng/mL Final    Vitamin B12 01/15/2024 628  211 - 911 pg/mL Final    Thyroid Stimulating Hormone 01/15/2024 0.48  0.44 - 3.98 mIU/L Final   Admission on 12/26/2023, Discharged on 12/26/2023   Component Date Value Ref Range Status    Ventricular Rate 12/26/2023 62  BPM Final    QRS Duration 12/26/2023 68  ms Final    QT Interval 12/26/2023 416  ms Final    QTC Calculation(Bazett) 12/26/2023 422  ms Final    R Axis 12/26/2023 80  degrees Final    T Axis 12/26/2023 60  degrees Final    QRS Count 12/26/2023 11  beats Final    Q Onset 12/26/2023 225  ms Final    T Offset 12/26/2023 433  ms Final    QTC Fredericia 12/26/2023 420  ms Final    Color, Urine 12/26/2023 Light-Yellow  Light-Yellow, Yellow, Dark-Yellow Final    Appearance, Urine 12/26/2023 Clear  Clear Final    Specific Gravity, Urine 12/26/2023 1.021  1.005 - 1.035 Final    pH, Urine 12/26/2023 6.5  5.0, 5.5, 6.0, 6.5, 7.0, 7.5, 8.0 Final    Protein, Urine 12/26/2023 10 (TRACE)  NEGATIVE, 10 (TRACE), 20 (TRACE) mg/dL Final    Glucose, Urine 12/26/2023 Normal  Normal mg/dL Final    Blood, Urine 12/26/2023 NEGATIVE  NEGATIVE Final    Ketones, Urine 12/26/2023 NEGATIVE  NEGATIVE mg/dL Final    Bilirubin, Urine 12/26/2023 NEGATIVE  NEGATIVE Final    Urobilinogen, Urine 12/26/2023 Normal  Normal mg/dL Final    Nitrite, Urine 12/26/2023 NEGATIVE  NEGATIVE Final    Leukocyte Esterase, Urine 12/26/2023 NEGATIVE  NEGATIVE Final    WBC 12/26/2023 8.0  4.4 - 11.3 x10*3/uL Final    nRBC 12/26/2023 0.0  0.0 - 0.0 /100 WBCs Final    RBC 12/26/2023 4.70  4.00 - 5.20 x10*6/uL Final    Hemoglobin 12/26/2023 13.5  12.0 - 16.0 g/dL Final    Hematocrit 12/26/2023 43.2  36.0 - 46.0 % Final    MCV 12/26/2023 92  80 - 100 fL Final    MCH 12/26/2023 28.7  26.0 - 34.0 pg Final    MCHC 12/26/2023 31.3 (L)  32.0 - 36.0 g/dL Final     RDW 12/26/2023 13.2  11.5 - 14.5 % Final    Platelets 12/26/2023 218  150 - 450 x10*3/uL Final    Neutrophils % 12/26/2023 59.8  40.0 - 80.0 % Final    Immature Granulocytes %, Automated 12/26/2023 0.4  0.0 - 0.9 % Final    Lymphocytes % 12/26/2023 28.8  13.0 - 44.0 % Final    Monocytes % 12/26/2023 6.7  2.0 - 10.0 % Final    Eosinophils % 12/26/2023 3.4  0.0 - 6.0 % Final    Basophils % 12/26/2023 0.9  0.0 - 2.0 % Final    Neutrophils Absolute 12/26/2023 4.77  1.60 - 5.50 x10*3/uL Final    Immature Granulocytes Absolute, Au* 12/26/2023 0.03  0.00 - 0.50 x10*3/uL Final    Lymphocytes Absolute 12/26/2023 2.29  0.80 - 3.00 x10*3/uL Final    Monocytes Absolute 12/26/2023 0.53  0.05 - 0.80 x10*3/uL Final    Eosinophils Absolute 12/26/2023 0.27  0.00 - 0.40 x10*3/uL Final    Basophils Absolute 12/26/2023 0.07  0.00 - 0.10 x10*3/uL Final    Glucose 12/26/2023 85  65 - 99 mg/dL Final    Sodium 12/26/2023 139  133 - 145 mmol/L Final    Potassium 12/26/2023 4.7  3.4 - 5.1 mmol/L Final    Chloride 12/26/2023 103  97 - 107 mmol/L Final    Bicarbonate 12/26/2023 25  24 - 31 mmol/L Final    Urea Nitrogen 12/26/2023 21  8 - 25 mg/dL Final    Creatinine 12/26/2023 0.60  0.40 - 1.60 mg/dL Final    eGFR 12/26/2023 85  >60 mL/min/1.73m*2 Final    Calcium 12/26/2023 8.8  8.5 - 10.4 mg/dL Final    Albumin 12/26/2023 4.0  3.5 - 5.0 g/dL Final    Alkaline Phosphatase 12/26/2023 89  35 - 125 U/L Final    Total Protein 12/26/2023 7.0  5.9 - 7.9 g/dL Final    AST 12/26/2023 25  5 - 40 U/L Final    Bilirubin, Total 12/26/2023 0.6  0.1 - 1.2 mg/dL Final    ALT 12/26/2023 12  5 - 40 U/L Final    Anion Gap 12/26/2023 11  <=19 mmol/L Final    Lipase 12/26/2023 41  16 - 63 U/L Final    Coronavirus 2019, PCR 12/26/2023 Not Detected  Not Detected Final    Troponin T, High Sensitivity 12/26/2023 11  <=15 ng/L Final    WBC, Urine 12/26/2023 1-5  1-5, NONE /HPF Final    RBC, Urine 12/26/2023 3-5  NONE, 1-2, 3-5 /HPF Final    Squamous Epithelial  Cells, Urine 12/26/2023 1-9 (SPARSE)  Reference range not established. /HPF Final    Mucus, Urine 12/26/2023 FEW  Reference range not established. /LPF Final    Flu A Result 12/26/2023 Not Detected  Not Detected Final    Flu B Result 12/26/2023 Not Detected  Not Detected Final   Admission on 12/11/2023, Discharged on 12/11/2023   Component Date Value Ref Range Status    Ventricular Rate 12/11/2023 65  BPM Final    QRS Duration 12/11/2023 82  ms Final    QT Interval 12/11/2023 426  ms Final    QTC Calculation(Bazett) 12/11/2023 443  ms Final    R Axis 12/11/2023 61  degrees Final    T Axis 12/11/2023 57  degrees Final    QRS Count 12/11/2023 10  beats Final    Q Onset 12/11/2023 225  ms Final    T Offset 12/11/2023 438  ms Final    QTC Fredericia 12/11/2023 437  ms Final    Glucose 12/11/2023 90  65 - 99 mg/dL Final    Sodium 12/11/2023 141  133 - 145 mmol/L Final    Potassium 12/11/2023 4.2  3.4 - 5.1 mmol/L Final    Chloride 12/11/2023 105  97 - 107 mmol/L Final    Bicarbonate 12/11/2023 26  24 - 31 mmol/L Final    Urea Nitrogen 12/11/2023 22  8 - 25 mg/dL Final    Creatinine 12/11/2023 0.60  0.40 - 1.60 mg/dL Final    eGFR 12/11/2023 85  >60 mL/min/1.73m*2 Final    Calcium 12/11/2023 8.6  8.5 - 10.4 mg/dL Final    Anion Gap 12/11/2023 10  <=19 mmol/L Final    WBC 12/11/2023 8.0  4.4 - 11.3 x10*3/uL Final    nRBC 12/11/2023 0.0  0.0 - 0.0 /100 WBCs Final    RBC 12/11/2023 4.50  4.00 - 5.20 x10*6/uL Final    Hemoglobin 12/11/2023 13.1  12.0 - 16.0 g/dL Final    Hematocrit 12/11/2023 41.9  36.0 - 46.0 % Final    MCV 12/11/2023 93  80 - 100 fL Final    MCH 12/11/2023 29.1  26.0 - 34.0 pg Final    MCHC 12/11/2023 31.3 (L)  32.0 - 36.0 g/dL Final    RDW 12/11/2023 13.4  11.5 - 14.5 % Final    Platelets 12/11/2023 172  150 - 450 x10*3/uL Final    Neutrophils % 12/11/2023 62.0  40.0 - 80.0 % Final    Immature Granulocytes %, Automated 12/11/2023 0.3  0.0 - 0.9 % Final    Lymphocytes % 12/11/2023 27.9  13.0 - 44.0 % Final     Monocytes % 12/11/2023 6.6  2.0 - 10.0 % Final    Eosinophils % 12/11/2023 2.4  0.0 - 6.0 % Final    Basophils % 12/11/2023 0.8  0.0 - 2.0 % Final    Neutrophils Absolute 12/11/2023 4.95  1.60 - 5.50 x10*3/uL Final    Immature Granulocytes Absolute, Au* 12/11/2023 0.02  0.00 - 0.50 x10*3/uL Final    Lymphocytes Absolute 12/11/2023 2.22  0.80 - 3.00 x10*3/uL Final    Monocytes Absolute 12/11/2023 0.53  0.05 - 0.80 x10*3/uL Final    Eosinophils Absolute 12/11/2023 0.19  0.00 - 0.40 x10*3/uL Final    Basophils Absolute 12/11/2023 0.06  0.00 - 0.10 x10*3/uL Final    Troponin T, High Sensitivity 12/11/2023 10  <=15 ng/L Final    Troponin T, High Sensitivity 12/11/2023 10  <=15 ng/L Final         Physical Exam      Assessment/Plan     1. Moderate Alzheimer's dementia with psychotic disturbance, unspecified timing of dementia onset (CMS/[facility])  2. Confusion  3. Anxiety, sundowning   - check labs including tsh   - not on any meds for dementia, will start memantine (Namenda) 5 mg tablet and see if any improvement before moving to other meds   - - tried zoloft in past without success, low dose per darryl and had paradoxical effect; also was given ativan in Dec. Now taking hydroxyzine. Will stop hydroxyzine;  could consider use of escitalopram and may need low dose seroquel (black box warning discussed, last Qtc [id number]  - has appt w Dr. Adkins in April   - no mental health hx   - discussed that sundowing and delirium events need careful investigation, her behaviors may be related to pain and sleep.   - encourage Alz association for family, darryl who is primary caregiver documented mild and mod distress over care.   - stressed need for routine     4. Insomnia, unspecified type  - continue trazodone at hs  - suggest staying up a little later than 8 pm so she sleeps later in day      5. Polypharmacy  - on hydroxyzine- will stop due to anticholingeric effects and duplicate antihistamine effect with allegra  - on celebrex,  chronic, not clear if gets much pain relief from this, could consider stopping , tramadol prn   - Vit D dose is high on weekly dosing, will check levels      6. Primary osteoarthritis of both knees, chronic pain   - continue voltaren gel, optimize frequency for best results  - consider stopping celebrex and starting RTC tylenol with tramadol for breakthru pain   - heat.,cold  - rec use of lidocaine patches to knees  - just had injection done      7. Primary hypertension  - stable on current meds      8. Vitamin D deficiency  - hold vit D for now   - Vitamin D 25-Hydroxy,Total (for eval of Vitamin D levels)     9. B12 deficiency  - check level      10. Bilateral hearing loss, unspecified hearing loss type  - has aides, unclear if working well, will impact dementia       11.AF with long term AC  - does well on warfarin, will continue this plan for now.  Has had no falls.      12.  HM  - need to discuss RSV and covid booster vaccination, unclear if received Did get flu in July 2023.   - may also need PCV 20, only see pcv 13 done in past  - no documentation of shingrix  - mammo, would defer due to age and pt understanding of discomfort procedrue  - colon would defer due to age     Dispo: return in 4-6 weeks for fu appt, labs today, call darryl with results. Will start on memantine for now and stop hydroxyzine and Vit D. When follow up done, consider stopping celebrex and use tyelnol RTC aand tramadol prn along with non oral meds.

## 2024-02-12 NOTE — PATIENT INSTRUCTIONS
Thank you for meeting with me today. We discussed the following:     Edema  Try the elevation 20 mins 3x a day, compression hose 15-20 mm Hg should be fine. Check into the zip ones   Wear shoes or gripper socks with them.,   If that does not help, we can look closer at the amlodipine    Talk with family about coming here for primary care. If so, make a sooner appt.      Think about the PCV 20 to bring pneumonia vaccines up to date.       In addition, here are some general guidelines on brain health, pain control and sleep.   General brain health guidelines:  - Make sure your medical conditions are well controlled (e.g., high blood pressure, high cholesterol, diabetes, sleep apnea etc)  - Do not smoke or chew tobacco  - Limiit alcohol use to no more than 1 alcoholic beverage per day   - Address any sensory deficits (e.g., proper glasses for poor eyesight, hearing aids for hearing loss)  - Use a weekly pill box  - Eat a heart healthy diet (fruits, vegetables, lean meats, fatty fish, whole grains. Limit processed foods)  - Exercise or walk. Gradually increase to the goal of 5 days per week, 30 minutes at a time  - Try to get at least 7 hours of quality sleep per night  - Keep yourself mentally active daily by reading, playing cards, doing word searches, puzzles, etc.  - Challenge your brain with new cognitive tasks (new hobby, crafts, take a class, learn a language)  - Stay socially active by being part of a group or organization    General pain control guidelines  - try to stay ahead of pain by taking your medications sooner rather than later  - Tyelnol is generally a safe medication to take for pain. A general dose is 1000 mg every 6-8 hours; do not exceed 3000 mg or 3 doses in a day. Stay away from formulations that have Benadryl or diphenhydramine in them.   - Lidocaine gel or patch may help to relieve pain. 4% strength is over the counter.  - Voltaren gel 1% may be helpful in relieving pain. It is available over  the counter.   - avoid Nonsteroidal Anti-inflammatories (Nsaids) such as Motrin (ibuprofen), Aleve (naproxen) unless specifically recommended by your provider;  these can cause gastrointestinal and kidney problems.   - Use heat or cold as needed to help with pain.   - Distraction can be helpful.     General sleep guidelines  - Avoid over the counter sleep preparations with diphenhydramine or Benadryl. This medicine can cause confusion and increase risk of falls.   - Do not use alcohol to go to sleep.   - Melatonin is generally safe to try, start with 1-3 mg a couple of hours before sleep.   - Avoid all caffeine after 12 noon. Look for hidden sources such as chocolate.   - Try an herbal tea like chamomile or Sleepytime before bed.  - Turn off the TV or set a timer so it goes off.   - Establish a bedtime routine to tell your body it is time to sleep. It can be some relaxing music, reading a book, taking a shower, prayer/meditation, etc.       Please follow up with us in return to clinic: 6 months  If the weather is bad on the day of your next appointment, it can be changed to a virtual visit. Please call the office on the day of the visit and ask them to change it to a virtual visit.

## 2024-02-22 ENCOUNTER — APPOINTMENT (OUTPATIENT)
Dept: OTOLARYNGOLOGY | Facility: CLINIC | Age: 89
End: 2024-02-22
Payer: MEDICARE

## 2024-02-25 ENCOUNTER — APPOINTMENT (OUTPATIENT)
Dept: RADIOLOGY | Facility: HOSPITAL | Age: 89
End: 2024-02-25
Payer: MEDICARE

## 2024-02-25 ENCOUNTER — HOSPITAL ENCOUNTER (EMERGENCY)
Facility: HOSPITAL | Age: 89
Discharge: HOME | End: 2024-02-25
Attending: EMERGENCY MEDICINE
Payer: MEDICARE

## 2024-02-25 VITALS
TEMPERATURE: 97.3 F | RESPIRATION RATE: 16 BRPM | HEART RATE: 60 BPM | SYSTOLIC BLOOD PRESSURE: 130 MMHG | BODY MASS INDEX: 26.08 KG/M2 | WEIGHT: 152.78 LBS | HEIGHT: 64 IN | OXYGEN SATURATION: 94 % | DIASTOLIC BLOOD PRESSURE: 74 MMHG

## 2024-02-25 DIAGNOSIS — S62.102A CLOSED FRACTURE OF LEFT WRIST, INITIAL ENCOUNTER: Primary | ICD-10-CM

## 2024-02-25 PROCEDURE — 2500000001 HC RX 250 WO HCPCS SELF ADMINISTERED DRUGS (ALT 637 FOR MEDICARE OP): Performed by: EMERGENCY MEDICINE

## 2024-02-25 PROCEDURE — 99284 EMERGENCY DEPT VISIT MOD MDM: CPT | Mod: 25

## 2024-02-25 PROCEDURE — 25565 CLTX RDL&ULN SHFT FX W/MNPJ: CPT | Mod: LT | Performed by: EMERGENCY MEDICINE

## 2024-02-25 PROCEDURE — 73110 X-RAY EXAM OF WRIST: CPT | Mod: LT

## 2024-02-25 PROCEDURE — 73110 X-RAY EXAM OF WRIST: CPT | Mod: LEFT SIDE | Performed by: RADIOLOGY

## 2024-02-25 RX ORDER — TRAMADOL HYDROCHLORIDE 50 MG/1
50 TABLET ORAL ONCE
Status: COMPLETED | OUTPATIENT
Start: 2024-02-25 | End: 2024-02-25

## 2024-02-25 RX ADMIN — TRAMADOL HYDROCHLORIDE 50 MG: 50 TABLET ORAL at 04:33

## 2024-02-25 ASSESSMENT — COLUMBIA-SUICIDE SEVERITY RATING SCALE - C-SSRS
6. HAVE YOU EVER DONE ANYTHING, STARTED TO DO ANYTHING, OR PREPARED TO DO ANYTHING TO END YOUR LIFE?: NO
2. HAVE YOU ACTUALLY HAD ANY THOUGHTS OF KILLING YOURSELF?: NO
1. IN THE PAST MONTH, HAVE YOU WISHED YOU WERE DEAD OR WISHED YOU COULD GO TO SLEEP AND NOT WAKE UP?: NO

## 2024-02-25 ASSESSMENT — PAIN DESCRIPTION - FREQUENCY: FREQUENCY: CONSTANT/CONTINUOUS

## 2024-02-25 ASSESSMENT — PAIN DESCRIPTION - LOCATION: LOCATION: WRIST

## 2024-02-25 ASSESSMENT — PAIN DESCRIPTION - ONSET: ONSET: AWAKENED FROM SLEEP

## 2024-02-25 ASSESSMENT — PAIN - FUNCTIONAL ASSESSMENT
PAIN_FUNCTIONAL_ASSESSMENT: 0-10
PAIN_FUNCTIONAL_ASSESSMENT: 0-10

## 2024-02-25 ASSESSMENT — PAIN SCALES - GENERAL
PAINLEVEL_OUTOF10: 0 - NO PAIN
PAINLEVEL_OUTOF10: 3

## 2024-02-25 ASSESSMENT — PAIN DESCRIPTION - PROGRESSION: CLINICAL_PROGRESSION: NOT CHANGED

## 2024-02-25 NOTE — ED PROVIDER NOTES
HPI   Chief Complaint   Patient presents with   • Fall     Left wrist injury. Unwitnessed fall.  Poor historian       Patient presents to the emergency department today after being brought in by her daughter.  The patient lives with the daughter, and was most likely getting herself off of her bed today by using her hands to feel her way along the edge of the bed.  The patient most likely then missed the edge of the bed, and then fell off to the side and daughter states that she came in to find her on the ground complaining of pain to her left wrist.  Patient had no head injury.  Patient had a loss of conscious.  Patient had no nausea or vomiting.  The patient not complaining of other pain except for the left wrist.  Patient's daughter was concerned because the left wrist appeared to be deformed, she was concerned with possibility of fracture.       Kellogg Coma Scale Score: 14         Patient History   Past Medical History:   Diagnosis Date   • Hypertension    • Other secondary hypertension     Other secondary hypertension   • Personal history of other diseases of the circulatory system     History of hypertension     Past Surgical History:   Procedure Laterality Date   • OTHER SURGICAL HISTORY  03/02/2021    Appendectomy     No family history on file.  Social History     Tobacco Use   • Smoking status: Never   • Smokeless tobacco: Never   Substance Use Topics   • Alcohol use: Never   • Drug use: Never       Physical Exam   ED Triage Vitals [02/25/24 0340]   Temperature Heart Rate Respirations BP   36.3 °C (97.3 °F) 60 16 130/74      Pulse Ox Temp Source Heart Rate Source Patient Position   94 % Tympanic -- --      BP Location FiO2 (%)     -- --       Physical Exam  Vitals and nursing note reviewed.   Constitutional:       General: She is not in acute distress.     Appearance: She is well-developed.   HENT:      Head: Normocephalic and atraumatic.   Eyes:      Conjunctiva/sclera: Conjunctivae normal.    Cardiovascular:      Rate and Rhythm: Normal rate and regular rhythm.      Heart sounds: No murmur heard.  Pulmonary:      Effort: Pulmonary effort is normal. No respiratory distress.      Breath sounds: Normal breath sounds.   Abdominal:      Palpations: Abdomen is soft.      Tenderness: There is no abdominal tenderness.   Musculoskeletal:         General: Swelling (Left wrist), tenderness (Left wrist) and deformity (Left wrist) present.      Cervical back: Neck supple.   Skin:     General: Skin is warm and dry.   Neurological:      Mental Status: She is alert.         ED Course & MDM   ED Course as of 02/25/24 0447   Sun Feb 25, 2024 0443 X-ray demonstrates a mildly displaced radius and ulna fracture.  The distal fragment is displaced dorsally.  Patient is neurovascular intact distal to the area of the injury.  Patient will need to have the wrist splinted, and I will attempt to reduce the fracture. [FR]      ED Course User Index  [FR] Geovani Peters DO         Diagnoses as of 02/25/24 0447   Closed fracture of left wrist, initial encounter       Medical Decision Making  After my initial evaluation, the patient does have an obvious deformity, which I do believe represents a fracture.  The patient will go for an x-ray, and I will then reassess the patient afterwards.    Amount and/or Complexity of Data Reviewed  Radiology:  Decision-making details documented in ED Course.        Procedure  Orthopaedic Injury Treatment - Fracture    Performed by: Geovani Peters DO  Authorized by: Geovani Peters DO    Consent:     Consent obtained:  Verbal    Consent given by:  Guardian    Risks, benefits, and alternatives were discussed: yes      Risks discussed:  Nerve damage    Alternatives discussed:  No treatment and delayed treatment  Universal protocol:     Patient identity confirmed:  Hospital-assigned identification number and arm band  Injury:     Injury location:  Forearm    Forearm injury location:  L  forearm    Forearm fracture type: radial and ulnar shafts    Pre-procedure details:     Distal neurologic exam:  Normal    Distal perfusion: distal pulses strong      Range of motion: reduced    Sedation:     Sedation type:  None  Anesthesia:     Anesthesia method:  Local infiltration    Local anesthetic:  Lidocaine 1% w/o epi  Procedure details:     Manipulation performed: yes      Skin traction used: no      Skeletal traction used: yes      Reduction successful: yes      X-ray confirmed reduction: no      Immobilization:  Splint    Splint type:  Sugar tong    Supplies used:  Fiberglass    Attestation: Splint applied and adjusted personally by me    Post-procedure details:     Distal neurologic exam:  Normal    Distal perfusion: distal pulses strong      Range of motion: improved      Procedure completion:  Tolerated       Geovani Peters DO  02/25/24 0443

## 2024-02-27 ENCOUNTER — APPOINTMENT (OUTPATIENT)
Dept: OTOLARYNGOLOGY | Facility: CLINIC | Age: 89
End: 2024-02-27
Payer: MEDICARE

## 2024-04-02 ENCOUNTER — CLINICAL SUPPORT (OUTPATIENT)
Dept: AUDIOLOGY | Facility: CLINIC | Age: 89
End: 2024-04-02
Payer: MEDICARE

## 2024-04-02 DIAGNOSIS — H90.3 SENSORINEURAL HEARING LOSS (SNHL) OF BOTH EARS: Primary | ICD-10-CM

## 2024-04-02 PROCEDURE — HRANC PR HEARING AID NO CHARGE: Performed by: AUDIOLOGIST

## 2024-04-02 NOTE — PROGRESS NOTES
HEARING AID CHECK    private sale 3/3/21  RIGHT: [employer] Jt [address] ITE  LEFT: [employer] Waters 1200 ITE  FIT DATE:3/3/21  WARRANTY:3/3/23    The patient's volume control snapped off and is gone.  Radha called [employer] and they sent a replacement part, but they sent the incorrect volume wheel.  Radha will re order the correct part and call the patient's family member to pick it up when it arrives.  N/C    Appointment time:  2:40-2:50

## 2024-04-03 ENCOUNTER — OFFICE VISIT (OUTPATIENT)
Dept: GERIATRIC MEDICINE | Facility: CLINIC | Age: 89
End: 2024-04-03
Payer: MEDICARE

## 2024-04-03 ENCOUNTER — TELEPHONE (OUTPATIENT)
Dept: GERIATRIC MEDICINE | Facility: CLINIC | Age: 89
End: 2024-04-03
Payer: MEDICARE

## 2024-04-03 VITALS — SYSTOLIC BLOOD PRESSURE: 110 MMHG | DIASTOLIC BLOOD PRESSURE: 64 MMHG

## 2024-04-03 DIAGNOSIS — S62.102S CLOSED FRACTURE OF LEFT WRIST, SEQUELA: ICD-10-CM

## 2024-04-03 DIAGNOSIS — H90.3 SENSORINEURAL HEARING LOSS, BILATERAL: ICD-10-CM

## 2024-04-03 DIAGNOSIS — F02.B2 MODERATE ALZHEIMER'S DEMENTIA WITH PSYCHOTIC DISTURBANCE, UNSPECIFIED TIMING OF DEMENTIA ONSET (MULTI): ICD-10-CM

## 2024-04-03 DIAGNOSIS — R41.0 CONFUSION: ICD-10-CM

## 2024-04-03 DIAGNOSIS — G30.9 MODERATE ALZHEIMER'S DEMENTIA WITH PSYCHOTIC DISTURBANCE, UNSPECIFIED TIMING OF DEMENTIA ONSET (MULTI): ICD-10-CM

## 2024-04-03 DIAGNOSIS — R41.89 ALTERATION IN COGNITION: Primary | ICD-10-CM

## 2024-04-03 PROCEDURE — 1159F MED LIST DOCD IN RCRD: CPT | Performed by: NURSE PRACTITIONER

## 2024-04-03 PROCEDURE — 99215 OFFICE O/P EST HI 40 MIN: CPT | Performed by: NURSE PRACTITIONER

## 2024-04-03 PROCEDURE — 1160F RVW MEDS BY RX/DR IN RCRD: CPT | Performed by: NURSE PRACTITIONER

## 2024-04-03 PROCEDURE — 3078F DIAST BP <80 MM HG: CPT | Performed by: NURSE PRACTITIONER

## 2024-04-03 PROCEDURE — 3074F SYST BP LT 130 MM HG: CPT | Performed by: NURSE PRACTITIONER

## 2024-04-03 PROCEDURE — 1157F ADVNC CARE PLAN IN RCRD: CPT | Performed by: NURSE PRACTITIONER

## 2024-04-03 ASSESSMENT — ENCOUNTER SYMPTOMS
ALTERED MENTAL STATUS: 1
CONSTIPATION: 0
DIFFICULTY URINATING: 0
SHORTNESS OF BREATH: 0

## 2024-04-03 ASSESSMENT — PAIN SCALES - GENERAL: PAINLEVEL_OUTOF10: 0 - NO PAIN

## 2024-04-03 ASSESSMENT — PAIN - FUNCTIONAL ASSESSMENT: PAIN_FUNCTIONAL_ASSESSMENT: 0-10

## 2024-04-03 NOTE — PROGRESS NOTES
Subjective   Ms. Tran is 89 y.o. year old female and here for f/u of   Chief Complaint   Patient presents with    Follow-up    Altered Mental Status        Here with gladis.    Last visit  Per pt discussion/summary: note reviewed    Interim events:    Altered Mental Status     Per patient and caregiver (obtained in addition due to cognition)  - fxk L wrist in Feb, healing now. Was taking tylenol 1000 mg q 8 and alternating tramdol on the other 8 hours x 3 weeks straight, stopped this routine mid march. Still using tramadol bid and tylenol 1000 mg daily in the afternoon. This in addition to the celebrex.   - sleep is poor during the time the cast was on, but it off now and she is sleeping better.   - has gotten a better sleep pattern going but still talks to herself. Namenda had been helping but is not anymore. Had been taking bid still taking.   - Gladis decreased the trazodone to 25 mg with all the pain meds, but is back to 50 mg now. She snores at night and wakes intermittently.   - still giving hydroxyzine prn if she does not calm down within 2 hours of getting anxious.   - hearing aid broke.    Review of Systems   Respiratory:  Negative for shortness of breath.    Cardiovascular:  Negative for chest pain.   Gastrointestinal:  Negative for constipation.   Genitourinary:  Negative for difficulty urinating.       Objective   /64   Admission on 02/11/2024, Discharged on 02/11/2024   Component Date Value Ref Range Status    WBC 02/11/2024 6.9  4.4 - 11.3 x10*3/uL Final    nRBC 02/11/2024 0.0  0.0 - 0.0 /100 WBCs Final    RBC 02/11/2024 4.28  4.00 - 5.20 x10*6/uL Final    Hemoglobin 02/11/2024 12.7  12.0 - 16.0 g/dL Final    Hematocrit 02/11/2024 39.5  36.0 - 46.0 % Final    MCV 02/11/2024 92  80 - 100 fL Final    MCH 02/11/2024 29.7  26.0 - 34.0 pg Final    MCHC 02/11/2024 32.2  32.0 - 36.0 g/dL Final    RDW 02/11/2024 13.3  11.5 - 14.5 % Final    Platelets 02/11/2024 181  150 - 450 x10*3/uL Final    Neutrophils %  02/11/2024 59.3  40.0 - 80.0 % Final    Immature Granulocytes %, Automated 02/11/2024 1.3 (H)  0.0 - 0.9 % Final    Lymphocytes % 02/11/2024 30.8  13.0 - 44.0 % Final    Monocytes % 02/11/2024 3.5  2.0 - 10.0 % Final    Eosinophils % 02/11/2024 4.2  0.0 - 6.0 % Final    Basophils % 02/11/2024 0.9  0.0 - 2.0 % Final    Neutrophils Absolute 02/11/2024 4.06  1.60 - 5.50 x10*3/uL Final    Immature Granulocytes Absolute, Au* 02/11/2024 0.09  0.00 - 0.50 x10*3/uL Final    Lymphocytes Absolute 02/11/2024 2.11  0.80 - 3.00 x10*3/uL Final    Monocytes Absolute 02/11/2024 0.24  0.05 - 0.80 x10*3/uL Final    Eosinophils Absolute 02/11/2024 0.29  0.00 - 0.40 x10*3/uL Final    Basophils Absolute 02/11/2024 0.06  0.00 - 0.10 x10*3/uL Final    Glucose 02/11/2024 86  65 - 99 mg/dL Final    Sodium 02/11/2024 141  133 - 145 mmol/L Final    Potassium 02/11/2024 3.7  3.4 - 5.1 mmol/L Final    Chloride 02/11/2024 104  97 - 107 mmol/L Final    Bicarbonate 02/11/2024 28  24 - 31 mmol/L Final    Urea Nitrogen 02/11/2024 22  8 - 25 mg/dL Final    Creatinine 02/11/2024 0.70  0.40 - 1.60 mg/dL Final    eGFR 02/11/2024 82  >60 mL/min/1.73m*2 Final    Calcium 02/11/2024 8.6  8.5 - 10.4 mg/dL Final    Anion Gap 02/11/2024 9  <=19 mmol/L Final    Protime 02/11/2024 20.9 (H)  9.3 - 12.7 seconds Final    INR 02/11/2024 2.1 (H)  0.9 - 1.2 Final   Office Visit on 01/15/2024   Component Date Value Ref Range Status    Vitamin D, 25-Hydroxy, Total 01/15/2024 99  30 - 100 ng/mL Final    Vitamin B12 01/15/2024 628  211 - 911 pg/mL Final    Thyroid Stimulating Hormone 01/15/2024 0.48  0.44 - 3.98 mIU/L Final         Physical Exam  Vitals reviewed.   Constitutional:       General: She is not in acute distress.     Appearance: She is normal weight.   HENT:      Head: Normocephalic and atraumatic.      Nose: Nose normal.      Mouth/Throat:      Mouth: Mucous membranes are moist.   Cardiovascular:      Rate and Rhythm: Normal rate and regular rhythm.       Pulses: Normal pulses.      Heart sounds: Normal heart sounds.   Pulmonary:      Effort: Pulmonary effort is normal.      Breath sounds: Normal breath sounds.   Abdominal:      General: Abdomen is flat.      Palpations: Abdomen is soft.   Musculoskeletal:      Right wrist: Normal.      Left wrist: Tenderness present. Decreased range of motion.      Cervical back: Normal range of motion and neck supple.      Comments: Using brace   Neurological:      Mental Status: She is alert.           Assessment/Plan   1. Moderate Alzheimer's dementia with psychotic disturbance, unspecified timing of dementia onset (Multi)  2. Confusion  3. Alteration in cognition  - most likely due to polypharmacy as well as sensory deficits with hearing losses   - Comprehensive metabolic panel; Future  - CBC; Future  - Magnesium; Future    4. Closed fracture of left wrist, sequela  - pain controlled with brace and meds, fu with ortho [id number]  - will try to stop tramadol due to worsening confusion    5. Sensorineural hearing loss, bilateral  - hearing aid broken, gladis getting fixed.     Dispo: has reg visit coming up. Gladis to take pt for labs.

## 2024-04-03 NOTE — PATIENT INSTRUCTIONS
Thank you for meeting with me today. We discussed the following:   I think the changes are from polypharmacy--too much medicine.     Stay on the namenda for now. We may make changes in the future.    Stop the tramadol it might be making things worse. Do one tab at night for a week then stop it.  Use Tylenol 1000 mg three times a day for pain and after a week, can cut to twice a day if pain seems better.   Flonase needs to be used daily prn won't work  Don't use the bonine stuff, it can cause dryness. Allegra or claritin is best to use.   Hydroxyzine can also cause more confusion, try not to use it. Distraction can be helpful.   Lets get some labs to make sure those are not the cause. You will need to go to any  lab to do this.   Try to keep in routines. Avoid naps. Can try using melatonin 1-3 mg about an hour before bed.       In addition, here are some general guidelines on brain health, pain control and sleep.   General brain health guidelines:  - Make sure your medical conditions are well controlled (e.g., high blood pressure, high cholesterol, diabetes, sleep apnea etc)  - Do not smoke or chew tobacco  - Limiit alcohol use to no more than 1 alcoholic beverage per day   - Address any sensory deficits (e.g., proper glasses for poor eyesight, hearing aids for hearing loss)  - Use a weekly pill box  - Eat a heart healthy diet (fruits, vegetables, lean meats, fatty fish, whole grains. Limit processed foods)  - Exercise or walk. Gradually increase to the goal of 5 days per week, 30 minutes at a time  - Try to get at least 7 hours of quality sleep per night  - Keep yourself mentally active daily by reading, playing cards, doing word searches, puzzles, etc.  - Challenge your brain with new cognitive tasks (new hobby, crafts, take a class, learn a language)  - Stay socially active by being part of a group or organization    General pain control guidelines  - try to stay ahead of pain by taking your medications sooner  rather than later  - Tyelnol is generally a safe medication to take for pain. A general dose is 1000 mg every 6-8 hours; do not exceed 3000 mg or 3 doses in a day. Stay away from formulations that have Benadryl or diphenhydramine in them.   - Lidocaine gel or patch may help to relieve pain. 4% strength is over the counter.  - Voltaren gel 1% may be helpful in relieving pain. It is available over the counter.   - avoid Nonsteroidal Anti-inflammatories (Nsaids) such as Motrin (ibuprofen), Aleve (naproxen) unless specifically recommended by your provider;  these can cause gastrointestinal and kidney problems.   - Use heat or cold as needed to help with pain.   - Distraction can be helpful.     General sleep guidelines  - Avoid over the counter sleep preparations with diphenhydramine or Benadryl. This medicine can cause confusion and increase risk of falls.   - Do not use alcohol to go to sleep.   - Melatonin is generally safe to try, start with 1-3 mg a couple of hours before sleep.   - Avoid all caffeine after 12 noon. Look for hidden sources such as chocolate.   - Try an herbal tea like chamomile or Sleepytime before bed.  - Turn off the TV or set a timer so it goes off.   - Establish a bedtime routine to tell your body it is time to sleep. It can be some relaxing music, reading a book, taking a shower, prayer/meditation, etc.       Please follow up with us in 4/17/24 or 4/10 whichever one is working for you.   If the weather is bad on the day of your next appointment, it can be changed to a virtual visit. Please call the office on the day of the visit and ask them to change it to a virtual visit.

## 2024-04-10 ENCOUNTER — APPOINTMENT (OUTPATIENT)
Dept: BEHAVIORAL HEALTH | Facility: CLINIC | Age: 89
End: 2024-04-10
Payer: MEDICARE

## 2024-04-10 ENCOUNTER — APPOINTMENT (OUTPATIENT)
Dept: GERIATRIC MEDICINE | Facility: CLINIC | Age: 89
End: 2024-04-10
Payer: MEDICARE

## 2024-04-12 PROBLEM — R41.89 ALTERATION IN COGNITION: Status: ACTIVE | Noted: 2024-04-12

## 2024-04-12 PROBLEM — S62.102A CLOSED FRACTURE OF LEFT WRIST: Status: ACTIVE | Noted: 2024-04-12

## 2024-04-15 ENCOUNTER — APPOINTMENT (OUTPATIENT)
Dept: OCCUPATIONAL THERAPY | Facility: CLINIC | Age: 89
End: 2024-04-15
Payer: MEDICARE

## 2024-04-17 ENCOUNTER — APPOINTMENT (OUTPATIENT)
Dept: GERIATRIC MEDICINE | Facility: CLINIC | Age: 89
End: 2024-04-17
Payer: MEDICARE

## 2024-04-24 ENCOUNTER — APPOINTMENT (OUTPATIENT)
Dept: GERIATRIC MEDICINE | Facility: CLINIC | Age: 89
End: 2024-04-24
Payer: MEDICARE

## 2024-04-25 ENCOUNTER — OFFICE VISIT (OUTPATIENT)
Dept: OTOLARYNGOLOGY | Facility: CLINIC | Age: 89
End: 2024-04-25
Payer: MEDICARE

## 2024-04-25 ENCOUNTER — CLINICAL SUPPORT (OUTPATIENT)
Dept: AUDIOLOGY | Facility: CLINIC | Age: 89
End: 2024-04-25
Payer: MEDICARE

## 2024-04-25 VITALS — HEIGHT: 64 IN | TEMPERATURE: 96.3 F | BODY MASS INDEX: 26.98 KG/M2 | WEIGHT: 158 LBS

## 2024-04-25 DIAGNOSIS — H90.3 SENSORINEURAL HEARING LOSS (SNHL) OF BOTH EARS: Primary | ICD-10-CM

## 2024-04-25 DIAGNOSIS — H61.23 BILATERAL IMPACTED CERUMEN: Primary | ICD-10-CM

## 2024-04-25 PROCEDURE — 1160F RVW MEDS BY RX/DR IN RCRD: CPT | Performed by: OTOLARYNGOLOGY

## 2024-04-25 PROCEDURE — 99213 OFFICE O/P EST LOW 20 MIN: CPT | Performed by: OTOLARYNGOLOGY

## 2024-04-25 PROCEDURE — 1157F ADVNC CARE PLAN IN RCRD: CPT | Performed by: OTOLARYNGOLOGY

## 2024-04-25 PROCEDURE — 1159F MED LIST DOCD IN RCRD: CPT | Performed by: OTOLARYNGOLOGY

## 2024-04-25 PROCEDURE — 1036F TOBACCO NON-USER: CPT | Performed by: OTOLARYNGOLOGY

## 2024-04-25 PROCEDURE — HRANC PR HEARING AID NO CHARGE: Performed by: AUDIOLOGIST

## 2024-04-25 NOTE — PROGRESS NOTES
HEARING AID CHECK    RIGHT: [employer]LAY WHITNEY [address]  ITE R SN: [id number]  LEFT:   CLAY WHITNEY [address]  ITE R SN: [id number]  FIT DATE: 2021  WARRANTY: NONE    This patient was seen for a HAC with the complaint that hearing aids are too loud after losing the vc on one aid.  Daughter does not want to send in as there will be a charge and mother has dementia and is sometimes unreliable in responses.  Otoscopy:  Having ears cleaned today.     The following programming changes were made:  Connected and brought down overall gain 3-6 and put them back in patient's ears while waiting for the  doctor.  She seemed to be fine with this.    The patient paid   no charge today for today's visit.  Return in 3- 6 months or sooner if needed.    APPOINTMENT TIME:  15 minutes

## 2024-04-26 ENCOUNTER — APPOINTMENT (OUTPATIENT)
Dept: AUDIOLOGY | Facility: CLINIC | Age: 89
End: 2024-04-26

## 2024-04-26 NOTE — PROGRESS NOTES
DOMINIQUE Tran is a 89 y.o. female history of recurrent cerumen impactions, nasal polyps.  She has been doing reasonably well although dementia is progressing.  Wears hearing aids bilaterally.  Has been demonstrating symptoms of needing cleaning.  She has been doing okay with her nasal symptoms.     Past Medical History:   Diagnosis Date    Hypertension     Other secondary hypertension     Other secondary hypertension    Personal history of other diseases of the circulatory system     History of hypertension            Medications:     Current Outpatient Medications:     acetaminophen (Tylenol) 500 mg capsule, Take 2 capsules (1,000 mg) by mouth every 8 hours., Disp: 30 capsule, Rfl: 0    amLODIPine (Norvasc) 2.5 mg tablet, Take 1 tablet (2.5 mg) by mouth once daily., Disp: , Rfl:     ascorbic acid (Vitamin C) 500 mg tablet, Take 1 tablet (500 mg) by mouth once daily., Disp: , Rfl:     calcium carbonate-vitamin D3 (Oysco 500/D) 500 mg-5 mcg (200 unit) tablet, Take 1 tablet by mouth 2 times a day with meals., Disp: , Rfl:     celecoxib (CeleBREX) 100 mg capsule, Take 1 capsule (100 mg) by mouth 2 times a day. 100 q am and 200 mg q pm, Disp: , Rfl:     diclofenac sodium (Voltaren) 1 % gel gel, Apply 1 Application topically 4 times a day., Disp: 100 g, Rfl: 1    fexofenadine (Allegra) 180 mg tablet, Take 1 tablet (180 mg) by mouth once daily as needed., Disp: , Rfl:     furosemide (Lasix) 40 mg tablet, Take 1 tablet (40 mg) by mouth once daily., Disp: , Rfl:     memantine (Namenda) 5 mg tablet, Take 1 tablet (5 mg) by mouth 2 times a day., Disp: 180 tablet, Rfl: 0    metoprolol succinate XL (Toprol-XL) 25 mg 24 hr tablet, Take 1 tablet (25 mg) by mouth once daily., Disp: , Rfl:     traMADol (Ultram) 50 mg tablet, Take 1 tablet (50 mg) by mouth every 6 hours if needed., Disp: , Rfl:     traZODone (Desyrel) 50 mg tablet, Take 1 tablet (50 mg) by mouth once daily at bedtime. 1/2 tab, Disp: , Rfl:     vit  "C/E/Zn/coppr/lutein/zeaxan (PRESERVISION AREDS-2 ORAL), Take by mouth., Disp: , Rfl:     warfarin (Coumadin) 5 mg tablet, Take 1 tablet (5 mg) by mouth once daily. As directed, Disp: , Rfl:     ergocalciferol (Vitamin D-2) 1.25 MG (93617 UT) capsule, Take 1 capsule (1,250 mcg) by mouth 1 (one) time per week., Disp: , Rfl:     hydrOXYzine HCL (Atarax) 25 mg tablet, Take 1 tablet (25 mg) by mouth once daily as needed for anxiety., Disp: , Rfl:      Allergies:  Allergies   Allergen Reactions    Codeine Nausea And Vomiting    Hydrocodone-Homatropine Diarrhea     Vomiting         Physical Exam:  Last Recorded Vitals  Temperature 35.7 °C (96.3 °F), height 1.626 m (5' 4\"), weight 71.7 kg (158 lb).  General:     General appearance: Well-developed, well-nourished in no acute distress.       Voice:  normal       Head/face: Normal appearance; nontender to palpation     Facial nerve function: Normal and symmetric bilaterally.    Oral/oropharynx:     Oral vestibule: Normal labial and gingival mucosa     Tongue/floor of mouth: Normal without lesion     Oropharynx: Clear.  No lesions present of the hard/soft palate, posterior pharynx    Neck:     Neck: Normal appearance, trachea midline     Salivary glands: Normal to palpation bilaterally     Lymph nodes: No cervical lymphadenopathy to palpation     Thyroid: No thyromegaly.  No palpable nodules     Range of motion: Normal    Neurological:     Cortical functions: Alert and oriented x3, appropriate affect       Larynx/hypopharynx:     Laryngeal findings: Mirror exam inadequate or limited secondary to enlarged base of tongue and/or excessive gagging    Ear:     Ear canal: Normal bilaterally after cleaning down cerumen impaction bilaterally     Tympanic membrane: Intact and mobile bilaterally     Pinna: Normal bilaterally.  1 cm postauricular cyst around the earlobe on the left-hand side aNo skin changes.  No active bleeding.     Hearing:  Gross hearing assessment normal by " voice    Nose:     Visualized using: Anterior rhinoscopy     Nasopharynx: Inadequate mirror exam secondary to gag, anatomy.       Nasal dorsum: Nontraumatic midline appearance     Septum: Midline     Inferior turbinates: Normally sized     Mucosa: Bilateral, pink, normal appearing       Assessment/Plan   Ears cleaned bilaterally.  Replaced hearing aids and this showed improvement.  I will see her back in 6 months for routine cerumen management         Ramakrishna Driver MD

## 2024-05-03 ENCOUNTER — APPOINTMENT (OUTPATIENT)
Dept: OTOLARYNGOLOGY | Facility: CLINIC | Age: 89
End: 2024-05-03
Payer: MEDICARE

## 2024-05-20 ENCOUNTER — OFFICE VISIT (OUTPATIENT)
Dept: GERIATRIC MEDICINE | Facility: CLINIC | Age: 89
End: 2024-05-20
Payer: MEDICARE

## 2024-05-20 VITALS
SYSTOLIC BLOOD PRESSURE: 105 MMHG | TEMPERATURE: 99.6 F | BODY MASS INDEX: 27.12 KG/M2 | RESPIRATION RATE: 16 BRPM | WEIGHT: 158 LBS | DIASTOLIC BLOOD PRESSURE: 68 MMHG | HEART RATE: 72 BPM

## 2024-05-20 DIAGNOSIS — I10 PRIMARY HYPERTENSION: ICD-10-CM

## 2024-05-20 DIAGNOSIS — G30.9 MODERATE ALZHEIMER'S DEMENTIA WITH PSYCHOTIC DISTURBANCE, UNSPECIFIED TIMING OF DEMENTIA ONSET (MULTI): Primary | ICD-10-CM

## 2024-05-20 DIAGNOSIS — Z79.899 POLYPHARMACY: ICD-10-CM

## 2024-05-20 DIAGNOSIS — R41.89 ALTERATION IN COGNITION: ICD-10-CM

## 2024-05-20 DIAGNOSIS — I48.11 LONGSTANDING PERSISTENT ATRIAL FIBRILLATION (MULTI): ICD-10-CM

## 2024-05-20 DIAGNOSIS — G89.29 OTHER CHRONIC PAIN: ICD-10-CM

## 2024-05-20 DIAGNOSIS — F02.B2 MODERATE ALZHEIMER'S DEMENTIA WITH PSYCHOTIC DISTURBANCE, UNSPECIFIED TIMING OF DEMENTIA ONSET (MULTI): Primary | ICD-10-CM

## 2024-05-20 LAB
ALBUMIN SERPL BCP-MCNC: 4 G/DL (ref 3.4–5)
ALP SERPL-CCNC: 87 U/L (ref 33–136)
ALT SERPL W P-5'-P-CCNC: 11 U/L (ref 7–45)
ANION GAP SERPL CALC-SCNC: 14 MMOL/L (ref 10–20)
AST SERPL W P-5'-P-CCNC: 19 U/L (ref 9–39)
BILIRUB SERPL-MCNC: 0.4 MG/DL (ref 0–1.2)
BUN SERPL-MCNC: 23 MG/DL (ref 6–23)
CALCIUM SERPL-MCNC: 8.6 MG/DL (ref 8.6–10.6)
CHLORIDE SERPL-SCNC: 101 MMOL/L (ref 98–107)
CO2 SERPL-SCNC: 29 MMOL/L (ref 21–32)
CREAT SERPL-MCNC: 0.67 MG/DL (ref 0.5–1.05)
EGFRCR SERPLBLD CKD-EPI 2021: 83 ML/MIN/1.73M*2
ERYTHROCYTE [DISTWIDTH] IN BLOOD BY AUTOMATED COUNT: 13.5 % (ref 11.5–14.5)
GLUCOSE SERPL-MCNC: 89 MG/DL (ref 74–99)
HCT VFR BLD AUTO: 40 % (ref 36–46)
HGB BLD-MCNC: 12.9 G/DL (ref 12–16)
MAGNESIUM SERPL-MCNC: 2.11 MG/DL (ref 1.6–2.4)
MCH RBC QN AUTO: 28.8 PG (ref 26–34)
MCHC RBC AUTO-ENTMCNC: 32.3 G/DL (ref 32–36)
MCV RBC AUTO: 89 FL (ref 80–100)
NRBC BLD-RTO: 0 /100 WBCS (ref 0–0)
PLATELET # BLD AUTO: 225 X10*3/UL (ref 150–450)
POTASSIUM SERPL-SCNC: 4.3 MMOL/L (ref 3.5–5.3)
PROT SERPL-MCNC: 6.6 G/DL (ref 6.4–8.2)
RBC # BLD AUTO: 4.48 X10*6/UL (ref 4–5.2)
SODIUM SERPL-SCNC: 140 MMOL/L (ref 136–145)
WBC # BLD AUTO: 7 X10*3/UL (ref 4.4–11.3)

## 2024-05-20 PROCEDURE — 1036F TOBACCO NON-USER: CPT | Performed by: NURSE PRACTITIONER

## 2024-05-20 PROCEDURE — 3078F DIAST BP <80 MM HG: CPT | Performed by: NURSE PRACTITIONER

## 2024-05-20 PROCEDURE — 1160F RVW MEDS BY RX/DR IN RCRD: CPT | Performed by: NURSE PRACTITIONER

## 2024-05-20 PROCEDURE — 1157F ADVNC CARE PLAN IN RCRD: CPT | Performed by: NURSE PRACTITIONER

## 2024-05-20 PROCEDURE — 83735 ASSAY OF MAGNESIUM: CPT | Performed by: NURSE PRACTITIONER

## 2024-05-20 PROCEDURE — 3074F SYST BP LT 130 MM HG: CPT | Performed by: NURSE PRACTITIONER

## 2024-05-20 PROCEDURE — 1125F AMNT PAIN NOTED PAIN PRSNT: CPT | Performed by: NURSE PRACTITIONER

## 2024-05-20 PROCEDURE — 1159F MED LIST DOCD IN RCRD: CPT | Performed by: NURSE PRACTITIONER

## 2024-05-20 PROCEDURE — 99215 OFFICE O/P EST HI 40 MIN: CPT | Performed by: NURSE PRACTITIONER

## 2024-05-20 PROCEDURE — 85027 COMPLETE CBC AUTOMATED: CPT | Performed by: NURSE PRACTITIONER

## 2024-05-20 PROCEDURE — 80053 COMPREHEN METABOLIC PANEL: CPT | Performed by: NURSE PRACTITIONER

## 2024-05-20 PROCEDURE — 36415 COLL VENOUS BLD VENIPUNCTURE: CPT | Performed by: NURSE PRACTITIONER

## 2024-05-20 RX ORDER — MEMANTINE HYDROCHLORIDE 10 MG/1
10 TABLET ORAL SEE ADMIN INSTRUCTIONS
Qty: 60 TABLET | Refills: 1 | Status: SHIPPED | OUTPATIENT
Start: 2024-05-20 | End: 2024-07-19

## 2024-05-20 RX ORDER — LORAZEPAM 0.5 MG/1
0.5 TABLET ORAL DAILY PRN
COMMUNITY

## 2024-05-20 ASSESSMENT — PATIENT HEALTH QUESTIONNAIRE - PHQ9
2. FEELING DOWN, DEPRESSED OR HOPELESS: MORE THAN HALF THE DAYS
1. LITTLE INTEREST OR PLEASURE IN DOING THINGS: MORE THAN HALF THE DAYS
SUM OF ALL RESPONSES TO PHQ9 QUESTIONS 1 AND 2: 4

## 2024-05-20 ASSESSMENT — ENCOUNTER SYMPTOMS
APPETITE CHANGE: 0
DIFFICULTY URINATING: 0
SLEEP DISTURBANCE: 1
NERVOUS/ANXIOUS: 1
ACTIVITY CHANGE: 0
CONFUSION: 1
SHORTNESS OF BREATH: 0
ARTHRALGIAS: 1
LOSS OF SENSATION IN FEET: 0
OCCASIONAL FEELINGS OF UNSTEADINESS: 1
DEPRESSION: 1
CONSTIPATION: 0

## 2024-05-20 ASSESSMENT — PAIN SCALES - GENERAL: PAINLEVEL: 8

## 2024-05-20 NOTE — PROGRESS NOTES
Subjective   Ms. Tran is 89 y.o. year old female and here for f/u of   Chief Complaint   Patient presents with    Follow-up    dementia    Here with darryl.    Last visit [id number]  Per pt discussion/summary: note reviewed    Interim events:  Saw np at pcp for worsening anxiety, tearfulness, gave her some lorazepam    HPI   Per patient and caregiver (obtained in addition due to cognition)  - anxiety worse when checking INR at MD office, did not get blood work done either bc concern for anxiety; NP for [facility] gave them lorazepam; anxiety and crying most days.   - has been using melatonin 7 mg at hs and was helping with sleep.   - bp has dropped roma 100-105 2x over 2 weeks.   - some changes to routine over past couple of weeks.   - darryl says pt does snore, talks in sleep, never eval for claudia but would not tolerate testing now, gets up 3x a night for urination.   - darryl tearful     Review of Systems   Constitutional:  Negative for activity change and appetite change.   Respiratory:  Negative for shortness of breath.    Cardiovascular:  Negative for chest pain.   Gastrointestinal:  Negative for constipation.   Genitourinary:  Negative for difficulty urinating.   Musculoskeletal:  Positive for arthralgias and gait problem.   Psychiatric/Behavioral:  Positive for confusion and sleep disturbance. The patient is nervous/anxious.        Objective   /68   Pulse 72   Temp 37.6 °C (99.6 °F) (Tympanic)   Resp 16   Wt 71.7 kg (158 lb) Comment: per patient daughter  BMI 27.12 kg/m²   MoCA:  /30  No visits with results within 3 Month(s) from this visit.   Latest known visit with results is:   Admission on 02/11/2024, Discharged on 02/11/2024   Component Date Value Ref Range Status    WBC 02/11/2024 6.9  4.4 - 11.3 x10*3/uL Final    nRBC 02/11/2024 0.0  0.0 - 0.0 /100 WBCs Final    RBC 02/11/2024 4.28  4.00 - 5.20 x10*6/uL Final    Hemoglobin 02/11/2024 12.7  12.0 - 16.0 g/dL Final    Hematocrit 02/11/2024 39.5  36.0 - 46.0 %  Final    MCV 02/11/2024 92  80 - 100 fL Final    MCH 02/11/2024 29.7  26.0 - 34.0 pg Final    MCHC 02/11/2024 32.2  32.0 - 36.0 g/dL Final    RDW 02/11/2024 13.3  11.5 - 14.5 % Final    Platelets 02/11/2024 181  150 - 450 x10*3/uL Final    Neutrophils % 02/11/2024 59.3  40.0 - 80.0 % Final    Immature Granulocytes %, Automated 02/11/2024 1.3 (H)  0.0 - 0.9 % Final    Lymphocytes % 02/11/2024 30.8  13.0 - 44.0 % Final    Monocytes % 02/11/2024 3.5  2.0 - 10.0 % Final    Eosinophils % 02/11/2024 4.2  0.0 - 6.0 % Final    Basophils % 02/11/2024 0.9  0.0 - 2.0 % Final    Neutrophils Absolute 02/11/2024 4.06  1.60 - 5.50 x10*3/uL Final    Immature Granulocytes Absolute, Au* 02/11/2024 0.09  0.00 - 0.50 x10*3/uL Final    Lymphocytes Absolute 02/11/2024 2.11  0.80 - 3.00 x10*3/uL Final    Monocytes Absolute 02/11/2024 0.24  0.05 - 0.80 x10*3/uL Final    Eosinophils Absolute 02/11/2024 0.29  0.00 - 0.40 x10*3/uL Final    Basophils Absolute 02/11/2024 0.06  0.00 - 0.10 x10*3/uL Final    Glucose 02/11/2024 86  65 - 99 mg/dL Final    Sodium 02/11/2024 141  133 - 145 mmol/L Final    Potassium 02/11/2024 3.7  3.4 - 5.1 mmol/L Final    Chloride 02/11/2024 104  97 - 107 mmol/L Final    Bicarbonate 02/11/2024 28  24 - 31 mmol/L Final    Urea Nitrogen 02/11/2024 22  8 - 25 mg/dL Final    Creatinine 02/11/2024 0.70  0.40 - 1.60 mg/dL Final    eGFR 02/11/2024 82  >60 mL/min/1.73m*2 Final    Calcium 02/11/2024 8.6  8.5 - 10.4 mg/dL Final    Anion Gap 02/11/2024 9  <=19 mmol/L Final    Protime 02/11/2024 20.9 (H)  9.3 - 12.7 seconds Final    INR 02/11/2024 2.1 (H)  0.9 - 1.2 Final         Physical Exam  Constitutional:       General: She is not in acute distress.  HENT:      Nose: Nose normal.      Mouth/Throat:      Mouth: Mucous membranes are moist.      Pharynx: Oropharynx is clear.   Eyes:      Conjunctiva/sclera: Conjunctivae normal.   Pulmonary:      Effort: Pulmonary effort is normal.   Abdominal:      General: Abdomen is flat.    Musculoskeletal:         General: Normal range of motion.      Cervical back: Normal range of motion and neck supple.   Skin:     General: Skin is warm and dry.   Neurological:      Mental Status: She is alert. Mental status is at baseline.   Psychiatric:         Attention and Perception: She is inattentive.         Mood and Affect: Affect is flat.         Speech: Speech is tangential.         Behavior: Behavior is withdrawn. Behavior is cooperative.         Cognition and Memory: Cognition is impaired. Memory is impaired.         Judgment: Judgment is inappropriate.      Comments: Calm        Assessment/Plan     1. Moderate Alzheimer's dementia with psychotic disturbance, unspecified timing of dementia onset (Multi)  - worsening anxiety episodes mostly in day hours, pt sleep status is frequently disturbed by urination, will limit fluids after 1900  - continue on the melatonin, treat chronic pain   - memantine (Namenda) 10 mg tablet  - has lorazepam to use prn but cautioned against frequent use.     2. Alteration in cognition  - Comprehensive metabolic panel  - CBC  - Magnesium    3. Longstanding persistent atrial fibrillation (Multi)  - no sx of bleeding at this time     4. Primary hypertension  - stable, had a couple of lower bps, discussed role of fluids    5. Other chronic pain  - tylenol, tramadol and celebrex for pain (no hx of PUD or anemia)    6. Polypharmacy  - multiple meds but do not see any that may be worsening her anxiety (x lorazepam but was just started).     Dispo: suspect anxiety is related to worsening dementia coupled with sleep distubance from pain and urination and changes in routine. Prevention srini darryl. Labs done.

## 2024-05-20 NOTE — PATIENT INSTRUCTIONS
Thank you for meeting with me today. We discussed the following:     If bp is low, give more fluids.     Memantine can go 10 mg every morning and 5 mg every night for 1 week, then go to 10 mg twice a day. If not making a difference in a month call me and we will taper off.     Anxiety can be multifactorial; poor sleep  (waking up so many times overnight and not completing a full sleep cycle) may be causing this. Try limiting fluids after 7 pm. Make sure to do the tylenol 3 x a day, one dose near bedtime. Try to keep the routines stable.         Try elevating the head of bed a little to see if helps the snoring.     In addition, here are some general guidelines on brain health, pain control and sleep.   General brain health guidelines:  - Make sure your medical conditions are well controlled (e.g., high blood pressure, high cholesterol, diabetes, sleep apnea etc)  - Do not smoke or chew tobacco  - Limiit alcohol use to no more than 1 alcoholic beverage per day   - Address any sensory deficits (e.g., proper glasses for poor eyesight, hearing aids for hearing loss)  - Use a weekly pill box  - Eat a heart healthy diet (fruits, vegetables, lean meats, fatty fish, whole grains. Limit processed foods)  - Exercise or walk. Gradually increase to the goal of 5 days per week, 30 minutes at a time  - Try to get at least 7 hours of quality sleep per night  - Keep yourself mentally active daily by reading, playing cards, doing word searches, puzzles, etc.  - Challenge your brain with new cognitive tasks (new hobby, crafts, take a class, learn a language)  - Stay socially active by being part of a group or organization    General pain control guidelines  - try to stay ahead of pain by taking your medications sooner rather than later  - Tyelnol is generally a safe medication to take for pain. A general dose is 1000 mg every 6-8 hours; do not exceed 3000 mg or 3 doses in a day. Stay away from formulations that have Benadryl or  diphenhydramine in them.   - Lidocaine gel or patch may help to relieve pain. 4% strength is over the counter.  - Voltaren gel 1% may be helpful in relieving pain. It is available over the counter.   - avoid Nonsteroidal Anti-inflammatories (Nsaids) such as Motrin (ibuprofen), Aleve (naproxen) unless specifically recommended by your provider;  these can cause gastrointestinal and kidney problems.   - Use heat or cold as needed to help with pain.   - Distraction can be helpful.     General sleep guidelines  - Avoid over the counter sleep preparations with diphenhydramine or Benadryl. This medicine can cause confusion and increase risk of falls.   - Do not use alcohol to go to sleep.   - Melatonin is generally safe to try, start with 1-3 mg a couple of hours before sleep.   - Avoid all caffeine after 12 noon. Look for hidden sources such as chocolate.   - Try an herbal tea like chamomile or Sleepytime before bed.  - Turn off the TV or set a timer so it goes off.   - Establish a bedtime routine to tell your body it is time to sleep. It can be some relaxing music, reading a book, taking a shower, prayer/meditation, etc.       Please follow up with us in return to clinic: 3-4 months. Please bring all medications to follow up appointments.   If the weather is bad on the day of your next appointment, it can be changed to a virtual visit. Please call the office on the day of the visit and ask them to change it to a virtual visit.

## 2024-06-03 DIAGNOSIS — M17.0 PRIMARY OSTEOARTHRITIS OF BOTH KNEES: ICD-10-CM

## 2024-06-03 RX ORDER — DICLOFENAC SODIUM 10 MG/G
4 GEL TOPICAL 4 TIMES DAILY
Qty: 100 G | Refills: 1 | Status: SHIPPED | OUTPATIENT
Start: 2024-06-03

## 2024-06-19 ENCOUNTER — APPOINTMENT (OUTPATIENT)
Dept: CARDIOLOGY | Facility: CLINIC | Age: 89
End: 2024-06-19
Payer: MEDICARE

## 2024-06-24 DIAGNOSIS — G30.9 MODERATE ALZHEIMER'S DEMENTIA WITH PSYCHOTIC DISTURBANCE, UNSPECIFIED TIMING OF DEMENTIA ONSET (MULTI): ICD-10-CM

## 2024-06-24 DIAGNOSIS — F02.B2 MODERATE ALZHEIMER'S DEMENTIA WITH PSYCHOTIC DISTURBANCE, UNSPECIFIED TIMING OF DEMENTIA ONSET (MULTI): ICD-10-CM

## 2024-06-24 RX ORDER — MEMANTINE HYDROCHLORIDE 10 MG/1
10 TABLET ORAL SEE ADMIN INSTRUCTIONS
Qty: 60 TABLET | Refills: 1 | Status: SHIPPED | OUTPATIENT
Start: 2024-06-24 | End: 2024-08-23

## 2024-07-05 ENCOUNTER — OFFICE VISIT (OUTPATIENT)
Dept: GERIATRIC MEDICINE | Facility: CLINIC | Age: 89
End: 2024-07-05
Payer: MEDICARE

## 2024-07-05 VITALS
TEMPERATURE: 98.5 F | DIASTOLIC BLOOD PRESSURE: 71 MMHG | WEIGHT: 162.6 LBS | SYSTOLIC BLOOD PRESSURE: 115 MMHG | RESPIRATION RATE: 16 BRPM | HEART RATE: 66 BPM | BODY MASS INDEX: 27.91 KG/M2

## 2024-07-05 DIAGNOSIS — F03.92 SENILE DEMENTIA WITH PSYCHOSIS (MULTI): ICD-10-CM

## 2024-07-05 DIAGNOSIS — R44.3 HALLUCINATIONS: ICD-10-CM

## 2024-07-05 DIAGNOSIS — R45.1 AGITATION: Primary | ICD-10-CM

## 2024-07-05 DIAGNOSIS — R41.0 DELIRIUM: ICD-10-CM

## 2024-07-05 PROCEDURE — 99215 OFFICE O/P EST HI 40 MIN: CPT | Performed by: NURSE PRACTITIONER

## 2024-07-05 PROCEDURE — 3078F DIAST BP <80 MM HG: CPT | Performed by: NURSE PRACTITIONER

## 2024-07-05 PROCEDURE — 1160F RVW MEDS BY RX/DR IN RCRD: CPT | Performed by: NURSE PRACTITIONER

## 2024-07-05 PROCEDURE — 3074F SYST BP LT 130 MM HG: CPT | Performed by: NURSE PRACTITIONER

## 2024-07-05 PROCEDURE — 1125F AMNT PAIN NOTED PAIN PRSNT: CPT | Performed by: NURSE PRACTITIONER

## 2024-07-05 PROCEDURE — G2212 PROLONG OUTPT/OFFICE VIS: HCPCS | Performed by: NURSE PRACTITIONER

## 2024-07-05 PROCEDURE — 1159F MED LIST DOCD IN RCRD: CPT | Performed by: NURSE PRACTITIONER

## 2024-07-05 PROCEDURE — 1157F ADVNC CARE PLAN IN RCRD: CPT | Performed by: NURSE PRACTITIONER

## 2024-07-05 RX ORDER — HALOPERIDOL 0.5 MG/1
0.5 TABLET ORAL DAILY PRN
Qty: 5 TABLET | Refills: 0 | Status: SHIPPED | OUTPATIENT
Start: 2024-07-05 | End: 2024-07-29 | Stop reason: WASHOUT

## 2024-07-05 RX ORDER — OLANZAPINE 2.5 MG/1
TABLET ORAL NIGHTLY
COMMUNITY
End: 2024-07-29 | Stop reason: WASHOUT

## 2024-07-05 ASSESSMENT — PATIENT HEALTH QUESTIONNAIRE - PHQ9
SUM OF ALL RESPONSES TO PHQ9 QUESTIONS 1 AND 2: 2
1. LITTLE INTEREST OR PLEASURE IN DOING THINGS: SEVERAL DAYS
2. FEELING DOWN, DEPRESSED OR HOPELESS: SEVERAL DAYS

## 2024-07-05 ASSESSMENT — PAIN SCALES - GENERAL: PAINLEVEL: 8

## 2024-07-05 ASSESSMENT — ENCOUNTER SYMPTOMS
OCCASIONAL FEELINGS OF UNSTEADINESS: 1
LOSS OF SENSATION IN FEET: 0

## 2024-07-05 NOTE — PROGRESS NOTES
Subjective   Patient ID: Priscilla Tran is a 89 y.o. female who presents for Follow-up.  Priscilla Tran is a 89 y.o. who presents today for an urgent sick visit with her daughter for complaints of confusion for more than a week following receiving a scheduled steroid injection (a week ago Wed)  in bilateral knees. DTR reports pt usually experiences some confusion for a few days but never for this length of time. She has tried treating patient with medications she has on hand including Ativan 0.25 mg, Trazodone 50 mg, and Melatonin 3 mg. DTR was administering 1/2 tab Ativan, 1/2 tab trazodone and 1 whole tab Melatonin every night with moderate symptom relief. Patient more verbally aggressive, agitated, mumbling under her breath, talking to people that aren't there. DTR feels symptoms have waxed and waned with treatment, but feels patient is not getting better. Additionally, DTR thought pt might have a UTI, but she tested negative. Patient was found to constipated, dtr gave her colace and bisacodyl, pt now has loose stools.        Review of Systems   Unable to perform ROS: Dementia (Acuity of Condition; presence of delirium)       Current Outpatient Medications on File Prior to Visit   Medication Sig Dispense Refill    fluticasone propionate (Xhance) 93 mcg/actuation aerosol breath activated 1 puff by Does not apply route twice a day.      predniSONE (Deltasone) 5 mg tablet Take 1 tablet (5 mg) by mouth early in the morning..      traMADol (Ultram) 50 mg tablet Take 1 tablet (50 mg) by mouth every 8 hours if needed for severe pain (7 - 10).      [DISCONTINUED] LORazepam (Ativan) 0.5 mg tablet Take 0.5 tablets (0.25 mg) by mouth once daily as needed for anxiety.      acetaminophen (Tylenol) 500 mg capsule Take 2 capsules (1,000 mg) by mouth every 8 hours. 30 capsule 0    amLODIPine (Norvasc) 2.5 mg tablet Take 1 tablet (2.5 mg) by mouth once daily.      ascorbic acid (Vitamin C) 500 mg tablet Take 1 tablet  (500 mg) by mouth once daily.      calcium carbonate-vitamin D3 (Oysco 500/D) 500 mg-5 mcg (200 unit) tablet Take 1 tablet by mouth 2 times a day with meals.      celecoxib (CeleBREX) 100 mg capsule Take 1 capsule (100 mg) by mouth 2 times a day. 100 q am and 200 mg q pm      diclofenac sodium (Voltaren) 1 % gel Apply 4.5 inches (4 g) topically 4 times a day. 100 g 1    furosemide (Lasix) 40 mg tablet Take 1 tablet (40 mg) by mouth once daily.      metoprolol succinate XL (Toprol-XL) 25 mg 24 hr tablet Take 1 tablet (25 mg) by mouth once daily.      vit C/E/Zn/coppr/lutein/zeaxan (PRESERVISION AREDS-2 ORAL) Take by mouth.      warfarin (Coumadin) 5 mg tablet Take 1 tablet (5 mg) by mouth once daily. As directed      [DISCONTINUED] memantine (Namenda) 10 mg tablet Take 1 tablet (10 mg) by mouth see administration instructions. 10 mg in morning and 5mg at night for one week, then 10 mg twice a day 60 tablet 1    [DISCONTINUED] OLANZapine (ZyPREXA) 2.5 mg tablet once daily at bedtime.      [DISCONTINUED] traZODone (Desyrel) 50 mg tablet Take 0.5 tablets (25 mg) by mouth once daily at bedtime. 1/2 tab       No current facility-administered medications on file prior to visit.         Objective   Visit Vitals  /71   Pulse 66   Temp 36.9 °C (98.5 °F) (Tympanic)   Resp 16   Wt 73.8 kg (162 lb 9.6 oz)   BMI 27.91 kg/m²   Smoking Status Never   BSA 1.83 m²     UTI neg  Gluc neg  Bilirub neg  Keton neg  Sp grav 1.015  Hbgn neg  pH 6.0  Protein neg  Urobilinogen neg  Nitrites neg  Leuk neg  Color/appear clear    Physical Exam  Psychiatric:         Attention and Perception: She is inattentive. She perceives auditory hallucinations.         Mood and Affect: Affect is labile and inappropriate.         Speech: Speech is rapid and pressured and tangential.         Behavior: Behavior is uncooperative and hyperactive.         Thought Content: Thought content is paranoid and delusional.         Cognition and Memory: Cognition is  impaired. Memory is impaired. She exhibits impaired recent memory.         Judgment: Judgment is impulsive and inappropriate.       Steadi Fall Risk  One or more falls in the last year? Yes  How many Times? 2 or more  Was the patient injured in the fall? No  Has trouble stepping onto curb? Yes  Advised to use a cane or walker to get around safely? Yes  Often has to rush to toilet? No  Feels unsteady when walking? Yes  Has lost some feeling in feet? No  Often feels sad or depressed?    Steadies self on furniture while walking at home? Yes  Takes medicine that makes them feel lightheaded or more tired than usual? No  Worried about Falling? Yes  Takes medicine to sleep or improve mood? Yes  Needs to push with hands when rising from a chair? 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 Per daughter, patient usually develops temporary mental status change for several days following injections in the past, but this was the first time symptoms have not resolved. After a week, daughter contacted [facility] for assistance with symptom management.  Instructed to stop lorazepam.  Start Haldol 0.5 mg once daily x 5 days   Patient may continue to take Melatonin and trazodone  Referral  to psychiatry  Recommend patient avoid receiving steroid injections in the future         Relevant Orders    Referral to Psychiatry       Time Spent  Prep time on day of patient encounter: 0 minutes  Time spent directly with patient, family or caregiver: 42 minutes  Additional Time Spent on Patient Care Activities: 10 minutes (Reviewing medication management of symptoms, review of polypharmacy practices of dtr/pcg)  Documentation Time: 35 minutes  Other Time Spent: 0 minutes  Total: 87 minutes        RADHA Hamlin-ADRIANNE 07/31/24 1:20 AM

## 2024-07-08 ENCOUNTER — APPOINTMENT (OUTPATIENT)
Dept: GERIATRIC MEDICINE | Facility: CLINIC | Age: 89
End: 2024-07-08
Payer: MEDICARE

## 2024-07-08 RX ORDER — HALOPERIDOL 0.5 MG/1
0.25 TABLET ORAL 2 TIMES DAILY
Qty: 15 TABLET | Refills: 0 | Status: SHIPPED | OUTPATIENT
Start: 2024-07-08 | End: 2024-07-29 | Stop reason: WASHOUT

## 2024-07-11 ENCOUNTER — TELEPHONE (OUTPATIENT)
Dept: GERIATRIC MEDICINE | Facility: CLINIC | Age: 89
End: 2024-07-11
Payer: MEDICARE

## 2024-07-17 ENCOUNTER — TELEPHONE (OUTPATIENT)
Dept: GERIATRIC MEDICINE | Facility: CLINIC | Age: 89
End: 2024-07-17
Payer: MEDICARE

## 2024-07-17 RX ORDER — PREDNISONE 5 MG/1
1 TABLET ORAL
COMMUNITY
Start: 2023-11-13

## 2024-07-29 ENCOUNTER — APPOINTMENT (OUTPATIENT)
Dept: BEHAVIORAL HEALTH | Facility: CLINIC | Age: 89
End: 2024-07-29
Payer: MEDICARE

## 2024-07-29 DIAGNOSIS — R44.3 HALLUCINATIONS: ICD-10-CM

## 2024-07-29 DIAGNOSIS — F03.92 SENILE DEMENTIA WITH PSYCHOSIS (MULTI): ICD-10-CM

## 2024-07-29 DIAGNOSIS — G30.9 MODERATE ALZHEIMER'S DEMENTIA WITH PSYCHOTIC DISTURBANCE, UNSPECIFIED TIMING OF DEMENTIA ONSET (MULTI): ICD-10-CM

## 2024-07-29 DIAGNOSIS — R41.0 DELIRIUM: ICD-10-CM

## 2024-07-29 DIAGNOSIS — F02.B2 MODERATE ALZHEIMER'S DEMENTIA WITH PSYCHOTIC DISTURBANCE, UNSPECIFIED TIMING OF DEMENTIA ONSET (MULTI): ICD-10-CM

## 2024-07-29 DIAGNOSIS — R45.1 AGITATION: ICD-10-CM

## 2024-07-29 PROCEDURE — 1159F MED LIST DOCD IN RCRD: CPT | Performed by: PSYCHIATRY & NEUROLOGY

## 2024-07-29 PROCEDURE — 99205 OFFICE O/P NEW HI 60 MIN: CPT | Performed by: PSYCHIATRY & NEUROLOGY

## 2024-07-29 PROCEDURE — 1160F RVW MEDS BY RX/DR IN RCRD: CPT | Performed by: PSYCHIATRY & NEUROLOGY

## 2024-07-29 PROCEDURE — 1157F ADVNC CARE PLAN IN RCRD: CPT | Performed by: PSYCHIATRY & NEUROLOGY

## 2024-07-29 RX ORDER — MEMANTINE HYDROCHLORIDE 10 MG/1
10 TABLET ORAL 2 TIMES DAILY
Qty: 60 TABLET | Refills: 1 | Status: SHIPPED | OUTPATIENT
Start: 2024-07-29 | End: 2024-09-27

## 2024-07-29 RX ORDER — QUETIAPINE FUMARATE 25 MG/1
12.5 TABLET, FILM COATED ORAL NIGHTLY
Qty: 15 TABLET | Refills: 1 | Status: SHIPPED | OUTPATIENT
Start: 2024-07-29 | End: 2024-09-27

## 2024-07-29 SDOH — HEALTH STABILITY: PHYSICAL HEALTH: ON AVERAGE, HOW MANY MINUTES DO YOU ENGAGE IN EXERCISE AT THIS LEVEL?: 10 MIN

## 2024-07-29 SDOH — ECONOMIC STABILITY: FOOD INSECURITY: WITHIN THE PAST 12 MONTHS, YOU WORRIED THAT YOUR FOOD WOULD RUN OUT BEFORE YOU GOT MONEY TO BUY MORE.: NEVER TRUE

## 2024-07-29 SDOH — ECONOMIC STABILITY: INCOME INSECURITY: IN THE LAST 12 MONTHS, WAS THERE A TIME WHEN YOU WERE NOT ABLE TO PAY THE MORTGAGE OR RENT ON TIME?: NO

## 2024-07-29 SDOH — ECONOMIC STABILITY: FOOD INSECURITY: WITHIN THE PAST 12 MONTHS, THE FOOD YOU BOUGHT JUST DIDN'T LAST AND YOU DIDN'T HAVE MONEY TO GET MORE.: NEVER TRUE

## 2024-07-29 SDOH — HEALTH STABILITY: PHYSICAL HEALTH: ON AVERAGE, HOW MANY DAYS PER WEEK DO YOU ENGAGE IN MODERATE TO STRENUOUS EXERCISE (LIKE A BRISK WALK)?: 7 DAYS

## 2024-07-29 SDOH — ECONOMIC STABILITY: TRANSPORTATION INSECURITY
IN THE PAST 12 MONTHS, HAS LACK OF TRANSPORTATION KEPT YOU FROM MEETINGS, WORK, OR FROM GETTING THINGS NEEDED FOR DAILY LIVING?: NO

## 2024-07-29 SDOH — ECONOMIC STABILITY: TRANSPORTATION INSECURITY
IN THE PAST 12 MONTHS, HAS THE LACK OF TRANSPORTATION KEPT YOU FROM MEDICAL APPOINTMENTS OR FROM GETTING MEDICATIONS?: NO

## 2024-07-29 ASSESSMENT — ENCOUNTER SYMPTOMS
DECREASED CONCENTRATION: 1
NERVOUS/ANXIOUS: 1
DISORGANIZED SPEECH: 1
HALLUCINATIONS: 1
AGITATION: 1
CONFUSION: 1
DELUSIONS: 1

## 2024-07-29 ASSESSMENT — SOCIAL DETERMINANTS OF HEALTH (SDOH)
HOW OFTEN DO YOU ATTEND CHURCH OR RELIGIOUS SERVICES?: NEVER
IN A TYPICAL WEEK, HOW MANY TIMES DO YOU TALK ON THE PHONE WITH FAMILY, FRIENDS, OR NEIGHBORS?: MORE THAN THREE TIMES A WEEK
DO YOU BELONG TO ANY CLUBS OR ORGANIZATIONS SUCH AS CHURCH GROUPS UNIONS, FRATERNAL OR ATHLETIC GROUPS, OR SCHOOL GROUPS?: NO
IN A TYPICAL WEEK, HOW MANY TIMES DO YOU TALK ON THE PHONE WITH FAMILY, FRIENDS, OR NEIGHBORS?: MORE THAN THREE TIMES A WEEK
HOW OFTEN DO YOU ATTENT MEETINGS OF THE CLUB OR ORGANIZATION YOU BELONG TO?: NEVER
HOW OFTEN DO YOU GET TOGETHER WITH FRIENDS OR RELATIVES?: MORE THAN THREE TIMES A WEEK
IN THE PAST 12 MONTHS, HAS THE ELECTRIC, GAS, OIL, OR WATER COMPANY THREATENED TO SHUT OFF SERVICE IN YOUR HOME?: NO
HOW OFTEN DO YOU ATTEND CHURCH OR RELIGIOUS SERVICES?: NEVER
HOW HARD IS IT FOR YOU TO PAY FOR THE VERY BASICS LIKE FOOD, HOUSING, MEDICAL CARE, AND HEATING?: NOT HARD AT ALL
HOW OFTEN DO YOU GET TOGETHER WITH FRIENDS OR RELATIVES?: MORE THAN THREE TIMES A WEEK
WITHIN THE LAST YEAR, HAVE YOU BEEN AFRAID OF YOUR PARTNER OR EX-PARTNER?: NO
HOW OFTEN DO YOU ATTENT MEETINGS OF THE CLUB OR ORGANIZATION YOU BELONG TO?: NEVER
WITHIN THE LAST YEAR, HAVE TO BEEN RAPED OR FORCED TO HAVE ANY KIND OF SEXUAL ACTIVITY BY YOUR PARTNER OR EX-PARTNER?: NO
WITHIN THE LAST YEAR, HAVE YOU BEEN HUMILIATED OR EMOTIONALLY ABUSED IN OTHER WAYS BY YOUR PARTNER OR EX-PARTNER?: NO
DO YOU BELONG TO ANY CLUBS OR ORGANIZATIONS SUCH AS CHURCH GROUPS UNIONS, FRATERNAL OR ATHLETIC GROUPS, OR SCHOOL GROUPS?: NO
WITHIN THE LAST YEAR, HAVE YOU BEEN KICKED, HIT, SLAPPED, OR OTHERWISE PHYSICALLY HURT BY YOUR PARTNER OR EX-PARTNER?: NO

## 2024-07-29 ASSESSMENT — LIFESTYLE VARIABLES
HOW MANY STANDARD DRINKS CONTAINING ALCOHOL DO YOU HAVE ON A TYPICAL DAY: PATIENT DOES NOT DRINK
AUDIT-C TOTAL SCORE: 0
SKIP TO QUESTIONS 9-10: 1
HOW OFTEN DO YOU HAVE A DRINK CONTAINING ALCOHOL: NEVER
HOW OFTEN DO YOU HAVE SIX OR MORE DRINKS ON ONE OCCASION: NEVER
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
## 2024-07-30 PROBLEM — E55.9 VITAMIN D DEFICIENCY: Status: ACTIVE | Noted: 2020-01-15

## 2024-07-30 PROBLEM — H61.20 IMPACTED CERUMEN: Status: ACTIVE | Noted: 2023-06-29

## 2024-07-30 PROBLEM — J33.0 POLYP OF NASAL CAVITY: Status: ACTIVE | Noted: 2024-07-30

## 2024-07-30 PROBLEM — R07.0 THROAT PAIN: Status: ACTIVE | Noted: 2019-06-27

## 2024-07-30 PROBLEM — G89.29 CHRONIC LOW BACK PAIN WITHOUT SCIATICA: Status: ACTIVE | Noted: 2020-01-15
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
## 2024-07-30 PROBLEM — E04.2 NON-TOXIC MULTINODULAR GOITER: Status: ACTIVE | Noted: 2023-06-29

## 2024-07-30 PROBLEM — J32.0 SINUSITIS, MAXILLARY, CHRONIC: Status: ACTIVE | Noted: 2019-05-15

## 2024-07-30 PROBLEM — M47.22 OSTEOARTHRITIS OF SPINE WITH RADICULOPATHY, CERVICAL REGION: Status: ACTIVE | Noted: 2018-08-07

## 2024-07-30 PROBLEM — Z86.79 HISTORY OF HYPERTENSION: Status: ACTIVE | Noted: 2023-06-29

## 2024-07-30 PROBLEM — M25.571 ARTHRALGIA OF RIGHT FOOT: Status: ACTIVE | Noted: 2019-10-17

## 2024-07-30 PROBLEM — I48.20 CHRONIC ATRIAL FIBRILLATION (MULTI): Status: ACTIVE | Noted: 2018-08-07

## 2024-07-30 PROBLEM — H35.30 MACULAR DEGENERATION: Status: ACTIVE | Noted: 2018-08-07

## 2024-07-30 PROBLEM — R11.2 NAUSEA & VOMITING: Status: ACTIVE | Noted: 2023-06-29

## 2024-07-30 PROBLEM — R41.0 DELIRIUM: Status: ACTIVE | Noted: 2024-07-30

## 2024-07-30 PROBLEM — G45.0 VERTEBROBASILAR INSUFFICIENCY: Status: ACTIVE | Noted: 2019-05-15

## 2024-07-30 PROBLEM — M54.50 CHRONIC LOW BACK PAIN WITHOUT SCIATICA: Status: ACTIVE | Noted: 2020-01-15

## 2024-07-30 PROBLEM — E04.1 THYROID NODULE: Status: ACTIVE | Noted: 2019-06-27

## 2024-07-30 PROBLEM — H81.393 PERIPHERAL VERTIGO OF BOTH EARS: Status: ACTIVE | Noted: 2019-05-15

## 2024-07-30 PROBLEM — H61.23 BILATERAL IMPACTED CERUMEN: Status: ACTIVE | Noted: 2019-05-14

## 2024-07-30 PROBLEM — I10 HYPERTENSION: Status: ACTIVE | Noted: 2018-08-07

## 2024-07-30 PROBLEM — J33.9 NASAL POLYPS: Status: ACTIVE | Noted: 2023-06-29

## 2024-07-30 PROBLEM — J32.2 SINUSITIS CHRONIC, ETHMOIDAL: Status: ACTIVE | Noted: 2019-05-15

## 2024-07-30 RX ORDER — FLUTICASONE PROPIONATE 93 UG/1
1 SPRAY, METERED NASAL 2 TIMES DAILY
COMMUNITY
Start: 2024-07-15 | End: 2024-08-14

## 2024-07-31 NOTE — PATIENT INSTRUCTIONS
Please consider the following general measures for minimizing delirium in a hospitalized patient:   -Bright lights during the day, keeps blinds up, switch all lights on   -avoid disturbances at night. Encourage at least 6 hours uninterrupted sleep. Consider d/c 4am vitals check  -avoid benzodiazepines, sedatives. Minimize opioids   -avoid anti-cholinergics    -avoid restraints.   - use low dose haldol 0.5mg PO (IM if PO not possible) only PRN severe agitation where pt exhibits volatile behavior and is a threat to self or others. EKGs to monitor QTc   -daily orientation to time and place by the staff   -out of bed to chair few hours everyday  - encourage stimulating activities during the day if possible

## 2024-07-31 NOTE — ASSESSMENT & PLAN NOTE
Patient with positive CAM for delirium following administration of steroid injection for arthritis joint knee pain. Per daughter, patient usually develops temporary mental status change for several days following injections in the past, but this was the first time symptoms have not resolved. After a week, daughter contacted Jason for assistance with symptom management.  Instructed to stop lorazepam.  Start Haldol 0.5 mg once daily x 5 days   Patient may continue to take Melatonin and trazodone  Referral to psychiatry  Recommend patient avoid receiving steroid injections in the future

## 2024-08-08 ENCOUNTER — OFFICE VISIT (OUTPATIENT)
Dept: GERIATRIC MEDICINE | Facility: CLINIC | Age: 89
End: 2024-08-08
Payer: MEDICARE

## 2024-08-08 VITALS
DIASTOLIC BLOOD PRESSURE: 73 MMHG | SYSTOLIC BLOOD PRESSURE: 111 MMHG | TEMPERATURE: 98.4 F | WEIGHT: 164.7 LBS | HEART RATE: 69 BPM | BODY MASS INDEX: 28.27 KG/M2 | RESPIRATION RATE: 18 BRPM

## 2024-08-08 DIAGNOSIS — F41.9 ANXIETY: ICD-10-CM

## 2024-08-08 DIAGNOSIS — Z79.899 POLYPHARMACY: ICD-10-CM

## 2024-08-08 DIAGNOSIS — G30.9 MODERATE ALZHEIMER'S DEMENTIA WITH PSYCHOTIC DISTURBANCE, UNSPECIFIED TIMING OF DEMENTIA ONSET (MULTI): ICD-10-CM

## 2024-08-08 DIAGNOSIS — F02.B2 MODERATE ALZHEIMER'S DEMENTIA WITH PSYCHOTIC DISTURBANCE, UNSPECIFIED TIMING OF DEMENTIA ONSET (MULTI): ICD-10-CM

## 2024-08-08 DIAGNOSIS — R41.0 CONFUSION: ICD-10-CM

## 2024-08-08 DIAGNOSIS — E55.9 VITAMIN D DEFICIENCY: Primary | ICD-10-CM

## 2024-08-08 PROCEDURE — 1157F ADVNC CARE PLAN IN RCRD: CPT | Performed by: NURSE PRACTITIONER

## 2024-08-08 PROCEDURE — 99215 OFFICE O/P EST HI 40 MIN: CPT | Performed by: NURSE PRACTITIONER

## 2024-08-08 PROCEDURE — 3074F SYST BP LT 130 MM HG: CPT | Performed by: NURSE PRACTITIONER

## 2024-08-08 PROCEDURE — 36415 COLL VENOUS BLD VENIPUNCTURE: CPT | Performed by: NURSE PRACTITIONER

## 2024-08-08 PROCEDURE — 1159F MED LIST DOCD IN RCRD: CPT | Performed by: NURSE PRACTITIONER

## 2024-08-08 PROCEDURE — 3078F DIAST BP <80 MM HG: CPT | Performed by: NURSE PRACTITIONER

## 2024-08-08 PROCEDURE — G2212 PROLONG OUTPT/OFFICE VIS: HCPCS | Performed by: NURSE PRACTITIONER

## 2024-08-08 PROCEDURE — 82306 VITAMIN D 25 HYDROXY: CPT | Performed by: NURSE PRACTITIONER

## 2024-08-08 NOTE — PROGRESS NOTES
"Subjective   Patient ID: Priscilla Tran is a 89 y.o. female who presents for Follow-up.  Priscilla Tran is a 89 y.o. who presents today for geriatric medicine follow-up with her daughter for complaints of confusion for more than a week following receiving a scheduled steroid injection in bilateral knees.     At previous visit, DTR reported pt previously experienced confusion for a few days after receiving steroid injections but never for such an extended period of time. DTR tried tx pt w/ previously prescribed Ativan, Trazodone, and Melatonin. DTR administered 1/2 dose Ativan and trazodone, and whole dose Melatonin every night w/ moderate symptom relief. Pt w/ inc agitation, verbally aggressive, mumbling under her breath, auditory/visual hallucinations. Sx waxed/waned w/ tx, not improving. Neg UTI. Neg Ctp.     At previous visit ativan was discontinued. Haldol started at 0.5 mg once daily for 5 days. Instructed to continue Melatonin and trazodone. DTR called over the weekend pt's regimen further adjusted. Haldol was increased to 0.5 mg BID, which was extended to 30 days. Per DTR's request pt referred to psychiatry. Referral made to Dr. Osullivan, but appointment made with Dr. Santoyo, who discontinued haldol and started pt on quetiapine. DTR believes patient is \"meaner\" on Seroquel. DTR instructed to stop steroid injections due to severe side effects.            Review of Systems   Unable to perform ROS: Dementia (Acuity of Condition; presence of delirium)       Current Outpatient Medications on File Prior to Visit   Medication Sig Dispense Refill    acetaminophen (Tylenol) 500 mg capsule Take 2 capsules (1,000 mg) by mouth every 8 hours. 30 capsule 0    amLODIPine (Norvasc) 2.5 mg tablet Take 1 tablet (2.5 mg) by mouth once daily.      ascorbic acid (Vitamin C) 500 mg tablet Take 1 tablet (500 mg) by mouth once daily.      calcium carbonate-vitamin D3 (Oysco 500/D) 500 mg-5 mcg (200 unit) tablet Take 1 tablet " by mouth 2 times daily (morning and late afternoon).      celecoxib (CeleBREX) 100 mg capsule Take 1 capsule (100 mg) by mouth 2 times a day. 100 q am and 200 mg q pm      diclofenac sodium (Voltaren) 1 % gel Apply 4.5 inches (4 g) topically 4 times a day. 100 g 1    fluticasone propionate (Xhance) 93 mcg/actuation aerosol breath activated 1 puff by Does not apply route twice a day.      furosemide (Lasix) 40 mg tablet Take 1 tablet (40 mg) by mouth once daily.      memantine (Namenda) 10 mg tablet Take 1 tablet (10 mg) by mouth 2 times a day. 60 tablet 1    metoprolol succinate XL (Toprol-XL) 25 mg 24 hr tablet Take 1 tablet (25 mg) by mouth once daily.      predniSONE (Deltasone) 5 mg tablet Take 1 tablet (5 mg) by mouth once daily as needed (polyps).      QUEtiapine (SEROquel) 25 mg tablet Take 0.5 tablets (12.5 mg) by mouth once daily at bedtime. 15 tablet 1    traMADol (Ultram) 50 mg tablet Take 1 tablet (50 mg) by mouth every 8 hours if needed for severe pain (7 - 10).      vit C/E/Zn/coppr/lutein/zeaxan (PRESERVISION AREDS-2 ORAL) Take by mouth.      warfarin (Coumadin) 5 mg tablet Take 1 tablet (5 mg) by mouth once daily. As directed       No current facility-administered medications on file prior to visit.         Objective   Visit Vitals  /73   Pulse 69   Temp 36.9 °C (98.4 °F) (Tympanic)   Resp 18   Wt 74.7 kg (164 lb 11.2 oz)   BMI 28.27 kg/m²   Smoking Status Never   BSA 1.84 m²       Physical Exam  Vitals reviewed.   Constitutional:       General: She is awake.      Appearance: She is ill-appearing.   HENT:      Head: Normocephalic.   Eyes:      Conjunctiva/sclera: Conjunctivae normal.   Cardiovascular:      Rate and Rhythm: Normal rate.      Pulses: Normal pulses.   Pulmonary:      Effort: Pulmonary effort is normal.   Abdominal:      General: Bowel sounds are normal.   Musculoskeletal:         General: Normal range of motion.      Cervical back: Normal range of motion.   Skin:     General: Skin  is warm.   Neurological:      Mental Status: She is alert. She is confused.      GCS: GCS eye subscore is 4. GCS verbal subscore is 4. GCS motor subscore is 6.   Psychiatric:         Attention and Perception: She is inattentive. She perceives auditory hallucinations.         Mood and Affect: Mood is anxious. Affect is flat.         Speech: Speech is tangential.         Behavior: Behavior is agitated. Behavior is cooperative.         Thought Content: Thought content is delusional.         Cognition and Memory: Cognition is impaired. Memory is impaired. She exhibits impaired recent memory.         Judgment: Judgment is inappropriate.       Steadi Fall Risk  One or more falls in the last year? Yes  How many Times? 2 or more  Was the patient injured in the fall? Yes  Comment:L wrist fx  Has trouble stepping onto curb?    Advised to use a cane or walker to get around safely? Yes  Comment:uses platform walker  Often has to rush to toilet?    Feels unsteady when walking? Comment:unable  Has lost some feeling in feet?    Often feels sad or depressed? Comment:unable  Steadies self on furniture while walking at home?    Takes medicine that makes them feel lightheaded or more tired than usual?    Worried about Falling?    Takes medicine to sleep or improve mood?    Needs to push with hands when rising from a chair?        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, F02.B2     Continue with memantine 10 mg twice a day         Relevant Orders    Follow Up In Geriatrics    Confusion R41.0    Relevant Orders    Follow Up In Geriatrics    Anxiety F41.9     Continue with quetiapine 12.5 mg (1/2 tab) once daily at bedtime  Address physical needs: pain, too cold/too warm, hunger/thirst, Toileting         Relevant Orders    Follow Up In Geriatrics    Vitamin D deficiency - Primary E55.9     Labs drawn: Vitamin D  25-Hydroxy,Total (for eval of Vitamin D levels)          Relevant Orders    Vitamin D 25-Hydroxy,Total (for eval of Vitamin D levels) (Completed)       Time Spent  Prep time on day of patient encounter: 0 minutes  Time spent directly with patient, family or caregiver: 52 minutes  Additional Time Spent on Patient Care Activities: 0 minutes  Documentation Time: 20 minutes  Other Time Spent: 0 minutes  Total: 72 minutes        Rachael Chavez, APRN-CNP

## 2024-08-09 ENCOUNTER — APPOINTMENT (OUTPATIENT)
Dept: GERIATRIC MEDICINE | Facility: CLINIC | Age: 89
End: 2024-08-09
Payer: MEDICARE

## 2024-08-09 ENCOUNTER — APPOINTMENT (OUTPATIENT)
Dept: BEHAVIORAL HEALTH | Facility: CLINIC | Age: 89
End: 2024-08-09
Payer: MEDICARE

## 2024-08-09 DIAGNOSIS — F03.918 SENILE DEMENTIA WITH DEPRESSION WITH BEHAVIORAL DISTURBANCE (MULTI): ICD-10-CM

## 2024-08-09 DIAGNOSIS — F03.93 SENILE DEMENTIA WITH DEPRESSION WITH BEHAVIORAL DISTURBANCE (MULTI): ICD-10-CM

## 2024-08-09 DIAGNOSIS — F03.92 SENILE DEMENTIA WITH PSYCHOSIS (MULTI): ICD-10-CM

## 2024-08-09 LAB — 25(OH)D3 SERPL-MCNC: 72 NG/ML (ref 30–100)

## 2024-08-09 PROCEDURE — 1157F ADVNC CARE PLAN IN RCRD: CPT | Performed by: PSYCHIATRY & NEUROLOGY

## 2024-08-09 PROCEDURE — 99214 OFFICE O/P EST MOD 30 MIN: CPT | Performed by: PSYCHIATRY & NEUROLOGY

## 2024-08-09 ASSESSMENT — ENCOUNTER SYMPTOMS
DELUSIONS: 1
CONFUSION: 1
AGITATION: 1
NERVOUS/ANXIOUS: 1
DECREASED CONCENTRATION: 1
HALLUCINATIONS: 1
DISORGANIZED SPEECH: 1
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
## 2024-08-12 ENCOUNTER — APPOINTMENT (OUTPATIENT)
Dept: GERIATRIC MEDICINE | Facility: CLINIC | Age: 89
End: 2024-08-12
Payer: MEDICARE

## 2024-08-16 NOTE — ASSESSMENT & PLAN NOTE
Continue with quetiapine 12.5 mg (1/2 tab) once daily at bedtime  Address physical needs: pain, too cold/too warm, hunger/thirst, Toileting

## 2024-09-05 ENCOUNTER — APPOINTMENT (OUTPATIENT)
Dept: AUDIOLOGY | Facility: CLINIC | Age: 89
End: 2024-09-05

## 2024-09-05 DIAGNOSIS — H90.3 SENSORINEURAL HEARING LOSS (SNHL) OF BOTH EARS: Primary | ICD-10-CM

## 2024-09-05 NOTE — PROGRESS NOTES
HEARING AID CHECK    RIGHT:RIGHT: CLAY WHITNEY 1200  ITE R SN: [id number]  LEFT:   CLAY SAPPIO 1200  ITE R SN: [id number]  FIT DATE: 2021  WARRANTY: NONE     This patient's daughter  was seen for a HAC with the complaint the left aid is blinking in the  even after a full night of charge. Patient has dementia and cannot always come in to the office.  She will be in tomorrow for ear cleaning.     I cleaned and checked the aids with new filters and vacuumed and daughter knows that the volume wheel cover is missing.  She feels aid is losing charge.  I connected and found that both aids were charged to 100% and they did sound robust.  I called [employer] and they recommended a send in if daughter would like to or she can monitor the aid by listening to it to determine if it needs to be sent in.   Patient and daughter know there is a charge for out of warranty aids.      The following programming changes were made: None , merely checked that aids were charged.    The patient paid  No charge as only connected to see if aids were charged for today's visit.  Return in 6 months or sooner if needed.    APPOINTMENT TIME:  30 minutes.

## 2024-09-06 ENCOUNTER — APPOINTMENT (OUTPATIENT)
Dept: OTOLARYNGOLOGY | Facility: CLINIC | Age: 89
End: 2024-09-06
Payer: MEDICARE

## 2024-09-06 VITALS — BODY MASS INDEX: 28 KG/M2 | HEIGHT: 64 IN | WEIGHT: 164 LBS | TEMPERATURE: 96.5 F

## 2024-09-06 DIAGNOSIS — H61.23 BILATERAL IMPACTED CERUMEN: Primary | ICD-10-CM

## 2024-09-06 PROCEDURE — 1159F MED LIST DOCD IN RCRD: CPT | Performed by: OTOLARYNGOLOGY

## 2024-09-06 PROCEDURE — 1157F ADVNC CARE PLAN IN RCRD: CPT | Performed by: OTOLARYNGOLOGY

## 2024-09-06 PROCEDURE — 1160F RVW MEDS BY RX/DR IN RCRD: CPT | Performed by: OTOLARYNGOLOGY

## 2024-09-06 PROCEDURE — 99213 OFFICE O/P EST LOW 20 MIN: CPT | Performed by: OTOLARYNGOLOGY

## 2024-09-06 NOTE — PROGRESS NOTES
HPI  Priscilla Tran is a 89 y.o. female history of recurrent cerumen impactions, nasal polyps.  She has been doing reasonably well although dementia is present and worsening over time.  Wears hearing aids bilaterally.  Has been demonstrating symptoms of needing cleaning.  She has been doing okay with her nasal symptoms.     Past Medical History:   Diagnosis Date    Hypertension     Other secondary hypertension     Other secondary hypertension    Personal history of other diseases of the circulatory system     History of hypertension            Medications:     Current Outpatient Medications:     acetaminophen (Tylenol) 500 mg capsule, Take 2 capsules (1,000 mg) by mouth every 8 hours., Disp: 30 capsule, Rfl: 0    amLODIPine (Norvasc) 2.5 mg tablet, Take 1 tablet (2.5 mg) by mouth once daily., Disp: , Rfl:     ascorbic acid (Vitamin C) 500 mg tablet, Take 1 tablet (500 mg) by mouth once daily., Disp: , Rfl:     calcium carbonate-vitamin D3 (Oysco 500/D) 500 mg-5 mcg (200 unit) tablet, Take 1 tablet by mouth 2 times daily (morning and late afternoon)., Disp: , Rfl:     celecoxib (CeleBREX) 100 mg capsule, Take 1 capsule (100 mg) by mouth 2 times a day. 100 q am and 200 mg q pm, Disp: , Rfl:     diclofenac sodium (Voltaren) 1 % gel, Apply 4.5 inches (4 g) topically 4 times a day., Disp: 100 g, Rfl: 1    furosemide (Lasix) 40 mg tablet, Take 1 tablet (40 mg) by mouth once daily., Disp: , Rfl:     memantine (Namenda) 10 mg tablet, Take 1 tablet (10 mg) by mouth 2 times a day., Disp: 60 tablet, Rfl: 1    metoprolol succinate XL (Toprol-XL) 25 mg 24 hr tablet, Take 1 tablet (25 mg) by mouth once daily., Disp: , Rfl:     predniSONE (Deltasone) 5 mg tablet, Take 1 tablet (5 mg) by mouth once daily as needed (polyps)., Disp: , Rfl:     traMADol (Ultram) 50 mg tablet, Take 1 tablet (50 mg) by mouth every 8 hours if needed for severe pain (7 - 10)., Disp: , Rfl:     vit C/E/Zn/coppr/lutein/zeaxan (PRESERVISION AREDS-2  "ORAL), Take by mouth., Disp: , Rfl:     warfarin (Coumadin) 5 mg tablet, Take 1 tablet (5 mg) by mouth once daily. As directed, Disp: , Rfl:     fluticasone propionate (Xhance) 93 mcg/actuation aerosol breath activated, 1 puff by Does not apply route twice a day., Disp: , Rfl:     QUEtiapine (SEROquel) 25 mg tablet, Take 0.5 tablets (12.5 mg) by mouth once daily at bedtime. (Patient not taking: Reported on 9/6/2024), Disp: 15 tablet, Rfl: 1     Allergies:  Allergies   Allergen Reactions    Codeine Nausea And Vomiting and Unknown    Hydrocodone-Homatropine Diarrhea     Vomiting        Physical Exam:  Last Recorded Vitals  Temperature 35.8 °C (96.5 °F), height 1.626 m (5' 4\"), weight 74.4 kg (164 lb).  General:     General appearance: Well-developed, well-nourished in no acute distress.       Voice:  normal       Head/face: Normal appearance; nontender to palpation     Facial nerve function: Normal and symmetric bilaterally.    Oral/oropharynx:     Oral vestibule: Normal labial and gingival mucosa     Tongue/floor of mouth: Normal without lesion     Oropharynx: Clear.  No lesions present of the hard/soft palate, posterior pharynx    Neck:     Neck: Normal appearance, trachea midline     Salivary glands: Normal to palpation bilaterally     Lymph nodes: No cervical lymphadenopathy to palpation     Thyroid: No thyromegaly.  No palpable nodules     Range of motion: Normal    Neurological:     Cortical functions: Alert and oriented x3, appropriate affect       Larynx/hypopharynx:     Laryngeal findings: Mirror exam inadequate or limited secondary to enlarged base of tongue and/or excessive gagging    Ear:     Ear canal: Normal bilaterally after cleaning cerumen impaction bilaterally     Tympanic membrane: Intact and mobile bilaterally     Pinna: Normal bilaterally.  1 cm postauricular cyst around the earlobe on the left-hand side.  No skin changes.  No active bleeding.     Hearing:  Gross hearing assessment normal by " voice    Nose:     Visualized using: Anterior rhinoscopy     Nasopharynx: Inadequate mirror exam secondary to gag, anatomy.       Nasal dorsum: Nontraumatic midline appearance     Septum: Midline     Inferior turbinates: Normally sized     Mucosa: Bilateral, pink, normal appearing       Assessment/Plan   Ears cleaned bilaterally.  Replaced hearing aids and this showed improvement.  I will see her back in 6 months for routine cerumen management         Ramakrishna Driver MD

## 2024-10-07 ENCOUNTER — APPOINTMENT (OUTPATIENT)
Dept: GERIATRIC MEDICINE | Facility: CLINIC | Age: 89
End: 2024-10-07
Payer: MEDICARE

## 2024-10-15 ENCOUNTER — APPOINTMENT (OUTPATIENT)
Dept: GERIATRIC MEDICINE | Facility: CLINIC | Age: 89
End: 2024-10-15
Payer: MEDICARE

## 2024-10-24 ENCOUNTER — CLINICAL SUPPORT (OUTPATIENT)
Dept: AUDIOLOGY | Facility: CLINIC | Age: 89
End: 2024-10-24

## 2024-11-08 ENCOUNTER — APPOINTMENT (OUTPATIENT)
Dept: OTOLARYNGOLOGY | Facility: CLINIC | Age: 89
End: 2024-11-08
Payer: MEDICARE

## 2024-11-13 ENCOUNTER — APPOINTMENT (OUTPATIENT)
Dept: OTOLARYNGOLOGY | Facility: CLINIC | Age: 89
End: 2024-11-13
Payer: MEDICARE

## 2024-11-13 VITALS — BODY MASS INDEX: 28 KG/M2 | TEMPERATURE: 97.1 F | WEIGHT: 164 LBS | HEIGHT: 64 IN

## 2024-11-13 DIAGNOSIS — H61.23 BILATERAL IMPACTED CERUMEN: ICD-10-CM

## 2024-11-13 DIAGNOSIS — R05.9 COUGH, UNSPECIFIED TYPE: Primary | ICD-10-CM

## 2024-11-13 PROCEDURE — 1160F RVW MEDS BY RX/DR IN RCRD: CPT | Performed by: OTOLARYNGOLOGY

## 2024-11-13 PROCEDURE — 99214 OFFICE O/P EST MOD 30 MIN: CPT | Performed by: OTOLARYNGOLOGY

## 2024-11-13 PROCEDURE — 1036F TOBACCO NON-USER: CPT | Performed by: OTOLARYNGOLOGY

## 2024-11-13 PROCEDURE — 1159F MED LIST DOCD IN RCRD: CPT | Performed by: OTOLARYNGOLOGY

## 2024-11-13 PROCEDURE — 1157F ADVNC CARE PLAN IN RCRD: CPT | Performed by: OTOLARYNGOLOGY

## 2024-11-13 RX ORDER — HALOPERIDOL 0.5 MG/1
0.25 TABLET ORAL NIGHTLY
COMMUNITY

## 2024-11-13 RX ORDER — ERGOCALCIFEROL 1.25 MG/1
50000 CAPSULE ORAL WEEKLY
COMMUNITY
Start: 2024-08-14

## 2024-11-13 RX ORDER — NYSTATIN 100000 [USP'U]/G
1 POWDER TOPICAL 2 TIMES DAILY
COMMUNITY
Start: 2024-10-18

## 2024-11-13 RX ORDER — AMOXICILLIN AND CLAVULANATE POTASSIUM 400; 57 MG/1; MG/1
2 TABLET, CHEWABLE ORAL EVERY 12 HOURS SCHEDULED
Qty: 40 TABLET | Refills: 0 | Status: SHIPPED | OUTPATIENT
Start: 2024-11-13 | End: 2024-11-23

## 2024-11-13 NOTE — PROGRESS NOTES
DOMINIQUE Tran is a 89 y.o. female follow-up on cough and cerumen.  She has had continued cough despite reflux treatment.  Today she has some audible wheezing although no shortness of breath.  She has dementia and has had trouble with worsened symptoms with steroids.  Recently treated with a shot for arthritis and mental status symptoms were considerably worsened during that time    Prior history: History of recurrent cerumen impactions, nasal polyps.  She has been doing reasonably well although dementia is present and worsening over time.  Wears hearing aids bilaterally.  Has been demonstrating symptoms of needing cleaning.  She has been doing okay with her nasal symptoms.     Past Medical History:   Diagnosis Date    Hypertension     Other secondary hypertension     Other secondary hypertension    Personal history of other diseases of the circulatory system     History of hypertension            Medications:     Current Outpatient Medications:     acetaminophen (Tylenol) 500 mg capsule, Take 2 capsules (1,000 mg) by mouth every 8 hours., Disp: 30 capsule, Rfl: 0    amLODIPine (Norvasc) 2.5 mg tablet, Take 1 tablet (2.5 mg) by mouth once daily., Disp: , Rfl:     ascorbic acid (Vitamin C) 500 mg tablet, Take 1 tablet (500 mg) by mouth once daily., Disp: , Rfl:     calcium carbonate-vitamin D3 (Oysco 500/D) 500 mg-5 mcg (200 unit) tablet, Take 1 tablet by mouth 2 times daily (morning and late afternoon)., Disp: , Rfl:     celecoxib (CeleBREX) 100 mg capsule, Take 1 capsule (100 mg) by mouth 2 times a day. 100 q am and 200 mg q pm, Disp: , Rfl:     diclofenac sodium (Voltaren) 1 % gel, Apply 4.5 inches (4 g) topically 4 times a day., Disp: 100 g, Rfl: 1    ergocalciferol (Vitamin D-2) 1.25 MG (25041 UT) capsule, Take 1 capsule (50,000 Units) by mouth once a week., Disp: , Rfl:     furosemide (Lasix) 40 mg tablet, Take 1 tablet (40 mg) by mouth once daily., Disp: , Rfl:     haloperidol (Haldol) 0.5 mg tablet,  "Take 0.5 tablets (0.25 mg) by mouth once daily at bedtime., Disp: , Rfl:     metoprolol succinate XL (Toprol-XL) 25 mg 24 hr tablet, Take 1 tablet (25 mg) by mouth once daily., Disp: , Rfl:     Nystop 100,000 unit/gram powder, 1 Application 2 times a day. to affected area, Disp: , Rfl:     predniSONE (Deltasone) 5 mg tablet, Take 1 tablet (5 mg) by mouth once daily as needed (polyps)., Disp: , Rfl:     traMADol (Ultram) 50 mg tablet, Take 1 tablet (50 mg) by mouth every 8 hours if needed for severe pain (7 - 10)., Disp: , Rfl:     vit C/E/Zn/coppr/lutein/zeaxan (PRESERVISION AREDS-2 ORAL), Take by mouth., Disp: , Rfl:     warfarin (Coumadin) 5 mg tablet, Take 1 tablet (5 mg) by mouth once daily. As directed, Disp: , Rfl:     fluticasone propionate (Xhance) 93 mcg/actuation aerosol breath activated, 1 puff by Does not apply route twice a day., Disp: , Rfl:     memantine (Namenda) 10 mg tablet, Take 1 tablet (10 mg) by mouth 2 times a day., Disp: 60 tablet, Rfl: 1    QUEtiapine (SEROquel) 25 mg tablet, Take 0.5 tablets (12.5 mg) by mouth once daily at bedtime. (Patient not taking: Reported on 9/6/2024), Disp: 15 tablet, Rfl: 1     Allergies:  Allergies   Allergen Reactions    Codeine Nausea And Vomiting and Unknown    Hydrocodone-Homatropine Diarrhea     Vomiting        Physical Exam:  Last Recorded Vitals  Temperature 36.2 °C (97.1 °F), height 1.626 m (5' 4\"), weight 74.4 kg (164 lb).  General:     General appearance: Well-developed, well-nourished in no acute distress.  She has sound of mild crepitus or wheezing with exhalation.  Occasional wet cough     Voice:  normal       Head/face: Normal appearance; nontender to palpation     Facial nerve function: Normal and symmetric bilaterally.    Oral/oropharynx:     Oral vestibule: Normal labial and gingival mucosa     Tongue/floor of mouth: Normal without lesion     Oropharynx: Clear.  No lesions present of the hard/soft palate, posterior pharynx    Neck:     Neck: " Normal appearance, trachea midline     Salivary glands: Normal to palpation bilaterally     Lymph nodes: No cervical lymphadenopathy to palpation     Thyroid: No thyromegaly.  No palpable nodules     Range of motion: Normal    Neurological:     Cortical functions: Alert and oriented x3, appropriate affect       Larynx/hypopharynx:     Laryngeal findings: Mirror exam inadequate or limited secondary to enlarged base of tongue and/or excessive gagging    Ear:     Ear canal: Normal bilaterally after cleaning mild cerumen bilaterally      Tympanic membrane: Intact and mobile bilaterally     Pinna: Normal bilaterally.  1 cm postauricular cyst around the earlobe on the left-hand side.  No skin changes.  No active bleeding.     Hearing:  Gross hearing assessment normal by voice    Nose:     Visualized using: Anterior rhinoscopy     Nasopharynx: Inadequate mirror exam secondary to gag, anatomy.       Nasal dorsum: Nontraumatic midline appearance     Septum: Midline     Inferior turbinates: Normally sized     Mucosa: Bilateral, pink, normal appearing       Assessment/Plan   Ears cleaned bilaterally.  Her cough sounds more infectious to me today.  I recommended a course of antibiotics and prescribed this today.  She has not had much success with oral inhalers in the past.  Recommend update in 2 weeks, sooner as needed         Ramakrishna Driver MD

## 2024-11-18 ENCOUNTER — PATIENT MESSAGE (OUTPATIENT)
Dept: OTOLARYNGOLOGY | Facility: CLINIC | Age: 89
End: 2024-11-18
Payer: MEDICARE

## 2024-11-18 DIAGNOSIS — R05.9 COUGH, UNSPECIFIED TYPE: Primary | ICD-10-CM

## 2024-11-19 RX ORDER — AMOXICILLIN AND CLAVULANATE POTASSIUM 400; 57 MG/5ML; MG/5ML
875 POWDER, FOR SUSPENSION ORAL EVERY 12 HOURS SCHEDULED
Qty: 218 ML | Refills: 0 | Status: SHIPPED | OUTPATIENT
Start: 2024-11-19 | End: 2024-11-29

## 2024-12-14 ENCOUNTER — OFFICE VISIT (OUTPATIENT)
Dept: URGENT CARE | Age: 89
End: 2024-12-14
Payer: MEDICARE

## 2024-12-14 VITALS
SYSTOLIC BLOOD PRESSURE: 140 MMHG | TEMPERATURE: 98.5 F | OXYGEN SATURATION: 95 % | HEART RATE: 66 BPM | DIASTOLIC BLOOD PRESSURE: 86 MMHG | RESPIRATION RATE: 18 BRPM

## 2024-12-14 DIAGNOSIS — L08.9 INFECTION OF SKIN AND SUBCUTANEOUS TISSUE: Primary | ICD-10-CM

## 2024-12-14 PROCEDURE — 1157F ADVNC CARE PLAN IN RCRD: CPT | Performed by: PHYSICIAN ASSISTANT

## 2024-12-14 PROCEDURE — 1160F RVW MEDS BY RX/DR IN RCRD: CPT | Performed by: PHYSICIAN ASSISTANT

## 2024-12-14 PROCEDURE — 3079F DIAST BP 80-89 MM HG: CPT | Performed by: PHYSICIAN ASSISTANT

## 2024-12-14 PROCEDURE — 99204 OFFICE O/P NEW MOD 45 MIN: CPT | Performed by: PHYSICIAN ASSISTANT

## 2024-12-14 PROCEDURE — 3077F SYST BP >= 140 MM HG: CPT | Performed by: PHYSICIAN ASSISTANT

## 2024-12-14 PROCEDURE — 1159F MED LIST DOCD IN RCRD: CPT | Performed by: PHYSICIAN ASSISTANT

## 2024-12-14 RX ORDER — CEPHALEXIN 500 MG/1
500 CAPSULE ORAL 2 TIMES DAILY
Qty: 30 CAPSULE | Refills: 0 | Status: SHIPPED | OUTPATIENT
Start: 2024-12-14 | End: 2024-12-21

## 2024-12-14 NOTE — PROGRESS NOTES
Subjective   Patient ID: Priscilla Tran is a 89 y.o. female. They present today with a chief complaint of Foot Pain (Today x R foot/ ankle pain, swelling,redness,warm /Wednesday x R knee drained ).    History of Present Illness  Here with daughter, patient has dementia, daughter states:    Fluid removed from knee on Wed from pain mgmt.     Erythema, pain right ankle started Friday, worse today.    Denies fever.          History provided by:  Relative      Past Medical History  Allergies as of 12/14/2024 - Reviewed 12/14/2024   Allergen Reaction Noted    Codeine Nausea And Vomiting and Unknown 09/01/2023    Hydrocodone-homatropine Diarrhea 09/01/2023       (Not in a hospital admission)       Past Medical History:   Diagnosis Date    Hypertension     Other secondary hypertension     Other secondary hypertension    Personal history of other diseases of the circulatory system     History of hypertension       Past Surgical History:   Procedure Laterality Date    OTHER SURGICAL HISTORY  03/02/2021    Appendectomy        reports that she has never smoked. She has never used smokeless tobacco. She reports that she does not drink alcohol and does not use drugs.    Review of Systems  Review of Systems   Unable to perform ROS: Dementia   Constitutional:  Negative for fever.   Skin:  Positive for color change.   All other systems reviewed and are negative.                                 Objective    Vitals:    12/14/24 1439   BP: 140/86   Pulse: 66   Resp: 18   Temp: 36.9 °C (98.5 °F)   SpO2: 95%     No LMP recorded.    Physical Exam  Vitals and nursing note reviewed.   Constitutional:       General: She is not in acute distress.  Cardiovascular:      Rate and Rhythm: Normal rate and regular rhythm.   Pulmonary:      Effort: Pulmonary effort is normal.      Breath sounds: Normal breath sounds.   Musculoskeletal:      Comments: Right LE: strength 5/5, full ROM. Mild erythema, mild swelling lateral ankle and lateral  proximal foot. No cellulitis. No ecchymosis.          Procedures    Point of Care Test & Imaging Results from this visit  No results found for this visit on 12/14/24.   No results found.    Diagnostic study results (if any) were reviewed by Angelia Monaco PA-C.    Assessment/Plan   Allergies, medications, history, and pertinent labs/EKGs/Imaging reviewed by Angelia Monaco PA-C.     Orders and Diagnoses  There are no diagnoses linked to this encounter.    Medical Admin Record      Patient disposition: Home    Electronically signed by Angelia Monaco PA-C  3:58 PM

## 2024-12-15 ASSESSMENT — ENCOUNTER SYMPTOMS
COLOR CHANGE: 1
FEVER: 0

## 2024-12-19 DIAGNOSIS — I10 PRIMARY HYPERTENSION: Primary | ICD-10-CM

## 2024-12-19 RX ORDER — AMLODIPINE BESYLATE 2.5 MG/1
2.5 TABLET ORAL DAILY
Qty: 90 TABLET | Refills: 3 | Status: SHIPPED | OUTPATIENT
Start: 2024-12-19

## 2025-02-21 ENCOUNTER — APPOINTMENT (OUTPATIENT)
Dept: GERIATRIC MEDICINE | Facility: CLINIC | Age: OVER 89
End: 2025-02-21
Payer: MEDICARE

## 2025-02-21 DIAGNOSIS — Z79.899 POLYPHARMACY: ICD-10-CM

## 2025-02-21 DIAGNOSIS — R41.0 DELIRIUM: ICD-10-CM

## 2025-02-21 DIAGNOSIS — F03.92 SENILE DEMENTIA WITH PSYCHOSIS (MULTI): Primary | ICD-10-CM

## 2025-02-21 DIAGNOSIS — R44.3 HALLUCINATIONS: ICD-10-CM

## 2025-02-21 PROCEDURE — 99215 OFFICE O/P EST HI 40 MIN: CPT | Performed by: NURSE PRACTITIONER

## 2025-02-21 PROCEDURE — 1157F ADVNC CARE PLAN IN RCRD: CPT | Performed by: NURSE PRACTITIONER

## 2025-03-21 ENCOUNTER — APPOINTMENT (OUTPATIENT)
Dept: OTOLARYNGOLOGY | Facility: CLINIC | Age: OVER 89
End: 2025-03-21
Payer: MEDICARE

## 2025-04-06 NOTE — PROGRESS NOTES
Virtual Consent    An interactive audio and video telecommunication system which permits real time communications between the patient (at the originating site) and provider (at the distant site) was utilized to provide this telehealth service.   Verbal consent was requested and obtained from Jennifer Tran, daughter of patient, Priscilla Tran on this date, 02/21/2025, for a telehealth visit and the patient's location was confirmed at the time of the visit.      Subjective   With patient's daughter's permission, this is a Telemedicine visit with video and audio. The provider and patient participated in this telemedicine encounter.     Priscilla Tran is a 89 y.o. female on telehealth visit for Follow-up, Dementia, and Psychological And Behavioral Symptoms Of Dementia for Senile dementia with psychosis, Anxiety, Agitation, Polypharmacy, Bilateral hearing loss, Hallucinations, Chronic atrial fibrillation (Multi), Chronic low back pain, osteoarthritis,  Hypertension, Peripheral vertigo of both ears, Tinnitus of both ears, B12 deficiency, Vitamin D deficiency, Macular degeneration. Daughter expressed intention to continue haloperidol as she is unable to manage patient's behaviors otherwise. States patient has become physically aggressive towards daughter which is out of character. Daughter continues to adjust dose of haldol as she determines by patient's behavior. Have recommended daughter contact office or prescribing provider before making changes.    Current Outpatient Medications on File Prior to Visit   Medication Sig Dispense Refill    acetaminophen (Tylenol) 500 mg capsule Take 2 capsules (1,000 mg) by mouth every 8 hours. 30 capsule 0    amLODIPine (Norvasc) 2.5 mg tablet TAKE 1 TABLET BY MOUTH EVERY DAY 90 tablet 3    ascorbic acid (Vitamin C) 500 mg tablet Take 1 tablet (500 mg) by mouth once daily.      calcium carbonate-vitamin D3 (Oysco 500/D) 500 mg-5 mcg (200 unit) tablet Take 1 tablet by mouth 2  times daily (morning and late afternoon).      celecoxib (CeleBREX) 100 mg capsule Take 1 capsule (100 mg) by mouth 2 times a day. 100 q am and 200 mg q pm      diclofenac sodium (Voltaren) 1 % gel Apply 4.5 inches (4 g) topically 4 times a day. 100 g 1    ergocalciferol (Vitamin D-2) 1.25 MG (48852 UT) capsule Take 1 capsule (50,000 Units) by mouth once a week.      fluticasone propionate (Xhance) 93 mcg/actuation aerosol breath activated 1 puff by Does not apply route twice a day.      furosemide (Lasix) 40 mg tablet Take 1 tablet (40 mg) by mouth once daily.      haloperidol (Haldol) 0.5 mg tablet Take 0.5 tablets (0.25 mg) by mouth once daily at bedtime.      memantine (Namenda) 10 mg tablet Take 1 tablet (10 mg) by mouth 2 times a day. 60 tablet 1    metoprolol succinate XL (Toprol-XL) 25 mg 24 hr tablet Take 1 tablet (25 mg) by mouth once daily.      Nystop 100,000 unit/gram powder 1 Application 2 times a day. to affected area      traMADol (Ultram) 50 mg tablet Take 1 tablet (50 mg) by mouth every 8 hours if needed for severe pain (7 - 10).      vit C/E/Zn/coppr/lutein/zeaxan (PRESERVISION AREDS-2 ORAL) Take by mouth.      warfarin (Coumadin) 5 mg tablet Take 1 tablet (5 mg) by mouth once daily. As directed      [DISCONTINUED] predniSONE (Deltasone) 5 mg tablet Take 1 tablet (5 mg) by mouth once daily as needed (polyps).      [DISCONTINUED] QUEtiapine (SEROquel) 25 mg tablet Take 0.5 tablets (12.5 mg) by mouth once daily at bedtime. (Patient not taking: Reported on 9/6/2024) 15 tablet 1     No current facility-administered medications on file prior to visit.     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)    Confusion    Anxiety    Insomnia    Polypharmacy    Primary osteoarthritis of both knees    Vitamin D deficiency    B12 deficiency    Bilateral hearing loss    Chronic low back pain without sciatica    Closed fracture of left wrist    Alteration in cognition    Senile dementia with psychosis (Multi)    Hallucinations    Agitation    Accidental fall    Arthralgia of right foot    Arthritis of knee, degenerative    Bilateral impacted cerumen    History of hypertension    Thyroid nodule    Impacted cerumen    Macular degeneration    Nausea & vomiting    Osteoarthritis of spine with radiculopathy, cervical region    Peripheral vertigo of both ears    Sinusitis chronic, ethmoidal    Sinusitis, maxillary, chronic    Vertebrobasilar insufficiency    Throat pain    Polyp of nasal cavity    Delirium    Senile dementia with depression with behavioral disturbance (Multi)     Past Medical History:   Diagnosis Date    Hypertension     Other secondary hypertension     Other secondary hypertension    Personal history of other diseases of the circulatory system     History of hypertension     Past Surgical History:   Procedure Laterality Date    OTHER SURGICAL HISTORY  03/02/2021    Appendectomy     No family history on file.    Social Drivers of Health     Tobacco Use: Medium Risk (3/4/2025)    Received from [facility]    Patient History     Smoking Tobacco Use: Never     Smokeless Tobacco Use: Former     Passive Exposure: Not on file   Alcohol Use: Not At Risk (7/29/2024)    AUDIT-C     Frequency of Alcohol Consumption: Never     Average Number of Drinks: Patient does not drink     Frequency of Binge Drinking: Never   Financial Resource Strain: Low Risk  (7/29/2024)    Overall Financial Resource Strain (CARDIA)     Difficulty of Paying  Living Expenses: Not hard at all   Food Insecurity: No Food Insecurity (7/29/2024)    Hunger Vital Sign     Worried About Running Out of Food in the Last Year: Never true     Ran Out of Food in the Last Year: Never true   Transportation Needs: No Transportation Needs (7/29/2024)    PRAPARE - Transportation     Lack of Transportation (Medical): No     Lack of Transportation (Non-Medical): No   Physical Activity: Insufficiently Active (7/29/2024)    Exercise Vital Sign     Days of Exercise per Week: 7 days     Minutes of Exercise per Session: 10 min   Stress: Stress Concern Present (7/29/2024)    [employer] [place] [employer] - Occupational Stress Questionnaire     Feeling of Stress : To some extent   Social Connections: Socially Isolated (7/29/2024)    Social Connection and Isolation Panel [NHANES]     Frequency of Communication with Friends and Family: More than three times a week     Frequency of Social Gatherings with Friends and Family: More than three times a week     Attends [employer] Services: Never     Active Member of Clubs or Organizations: No     Attends Club or Organization Meetings: Never     Marital Status:    Intimate Partner Violence: Not At Risk (7/29/2024)    Humiliation, Afraid, Rape, and Kick questionnaire     Fear of Current or Ex-Partner: No     Emotionally Abused: No     Physically Abused: No     Sexually Abused: No   Depression: Not at risk (12/17/2024)    Received from [facility]    PHQ-2     PHQ-2 score: 0   Housing Stability: Low Risk  (7/29/2024)    Housing Stability Vital Sign     Unable to Pay for Housing in the Last Year: No     Number of Times Moved in the Last Year: 1     Homeless in the Last Year: No   Utilities: Not At Risk (7/29/2024)    [facility] Utilities     Threatened with loss of utilities: No   Digital Equity: Not on file   Health Literacy: Not on file         Objective   There were no vitals taken for this visit.    Physical Exam  Psychiatric:          Attention and Perception: She is inattentive.         Mood and Affect: Mood is anxious. Affect is labile.         Speech: Speech is rapid and pressured and tangential.         Behavior: Behavior is hyperactive.         Thought Content: Thought content is delusional. Thought content does not include homicidal or suicidal plan.         Cognition and Memory: Cognition is impaired. Memory is impaired. She exhibits impaired recent memory and impaired remote memory.         Judgment: Judgment is impulsive and inappropriate.          Looks good on video, no respiratory distress           4M AGE-FRIENDLY INITIATIVE:  What matters most to patient: Unable to verbalize clearly   Medications: haloperidol, tramadol  Mentation: Dementia, with periods of delirium/psychosis  Mobility: walker. Assist x1, wheelchair    1. Senile dementia with psychosis (Multi) (Primary)  2. Delirium  3. Hallucinations  4. Polypharmacy    Priscilla Tran is a 89 y.o. female on telehealth visit for Follow-up, Dementia, and Psychological And Behavioral Symptoms Of Dementia for Senile dementia with psychosis, Anxiety, Agitation, Polypharmacy, Bilateral hearing loss, Hallucinations, Chronic atrial fibrillation (Multi), Chronic low back pain, osteoarthritis,  Hypertension, Peripheral vertigo of both ears, Tinnitus of both ears, B12 deficiency, Vitamin D deficiency, Macular degeneration. Daughter expressed intention to continue haloperidol as she is unable to manage patient's behaviors otherwise. States patient has become physically aggressive towards daughter which is out of character. Daughter continues to adjust dose of haldol as she determines by patient's behavior. Have recommended daughter contact office or prescribing provider before making changes.    Time Spent  Prep time on day of patient encounter: 0 minutes  Time spent directly with patient, family or caregiver: 20 minutes  Additional Time Spent on Patient Care Activities: 0  minutes  Documentation Time: 25 minutes  Other Time Spent: 0 minutes  Total: 45 minutes         Rachael Chavez, APRN-CNP   Geriatric Medicine

## 2025-04-18 ENCOUNTER — APPOINTMENT (OUTPATIENT)
Dept: OTOLARYNGOLOGY | Facility: CLINIC | Age: OVER 89
End: 2025-04-18
Payer: MEDICARE

## 2025-05-23 ENCOUNTER — APPOINTMENT (OUTPATIENT)
Dept: OTOLARYNGOLOGY | Facility: CLINIC | Age: OVER 89
End: 2025-05-23
Payer: MEDICARE

## 2025-06-20 ENCOUNTER — APPOINTMENT (OUTPATIENT)
Dept: OTOLARYNGOLOGY | Facility: CLINIC | Age: OVER 89
End: 2025-06-20
Payer: MEDICARE

## 2025-06-20 VITALS — HEIGHT: 64 IN | TEMPERATURE: 97.6 F | BODY MASS INDEX: 28.15 KG/M2

## 2025-06-20 DIAGNOSIS — H61.23 BILATERAL IMPACTED CERUMEN: Primary | ICD-10-CM

## 2025-06-20 PROCEDURE — 1160F RVW MEDS BY RX/DR IN RCRD: CPT | Performed by: OTOLARYNGOLOGY

## 2025-06-20 PROCEDURE — 99213 OFFICE O/P EST LOW 20 MIN: CPT | Performed by: OTOLARYNGOLOGY

## 2025-06-20 PROCEDURE — 1159F MED LIST DOCD IN RCRD: CPT | Performed by: OTOLARYNGOLOGY

## 2025-06-20 NOTE — PROGRESS NOTES
HPI  Priscilla Tran is a 89 y.o. female follow-up on cerumen.  Wears ha's.  Denies otalgia and otorrhea.    Prior history: History of recurrent cerumen impactions, nasal polyps.  She has been doing reasonably well although dementia is present and worsening over time.  Wears hearing aids bilaterally.  Has been demonstrating symptoms of needing cleaning.  She has been doing okay with her nasal symptoms.     Past Medical History:   Diagnosis Date    Hypertension     Other secondary hypertension     Other secondary hypertension    Personal history of other diseases of the circulatory system     History of hypertension            Medications:     Current Outpatient Medications:     acetaminophen (Tylenol) 500 mg capsule, Take 2 capsules (1,000 mg) by mouth every 8 hours., Disp: 30 capsule, Rfl: 0    amLODIPine (Norvasc) 2.5 mg tablet, TAKE 1 TABLET BY MOUTH EVERY DAY, Disp: 90 tablet, Rfl: 3    ascorbic acid (Vitamin C) 500 mg tablet, Take 1 tablet (500 mg) by mouth once daily., Disp: , Rfl:     calcium carbonate-vitamin D3 (Oysco 500/D) 500 mg-5 mcg (200 unit) tablet, Take 1 tablet by mouth 2 times daily (morning and late afternoon)., Disp: , Rfl:     celecoxib (CeleBREX) 100 mg capsule, Take 1 capsule (100 mg) by mouth 2 times a day. 100 q am and 200 mg q pm, Disp: , Rfl:     diclofenac sodium (Voltaren) 1 % gel, Apply 4.5 inches (4 g) topically 4 times a day., Disp: 100 g, Rfl: 1    ergocalciferol (Vitamin D-2) 1.25 MG (40150 UT) capsule, Take 1 capsule (50,000 Units) by mouth once a week., Disp: , Rfl:     furosemide (Lasix) 40 mg tablet, Take 1 tablet (40 mg) by mouth once daily., Disp: , Rfl:     haloperidol (Haldol) 0.5 mg tablet, Take 0.5 tablets (0.25 mg) by mouth once daily at bedtime., Disp: , Rfl:     metoprolol succinate XL (Toprol-XL) 25 mg 24 hr tablet, Take 1 tablet (25 mg) by mouth once daily., Disp: , Rfl:     Nystop 100,000 unit/gram powder, 1 Application 2 times a day. to affected area, Disp: ,  "Rfl:     traMADol (Ultram) 50 mg tablet, Take 1 tablet (50 mg) by mouth every 8 hours if needed for severe pain (7 - 10)., Disp: , Rfl:     vit C/E/Zn/coppr/lutein/zeaxan (PRESERVISION AREDS-2 ORAL), Take by mouth., Disp: , Rfl:     warfarin (Coumadin) 5 mg tablet, Take 1 tablet (5 mg) by mouth once daily. As directed, Disp: , Rfl:     fluticasone propionate (Xhance) 93 mcg/actuation aerosol breath activated, 1 puff by Does not apply route twice a day., Disp: , Rfl:     memantine (Namenda) 10 mg tablet, Take 1 tablet (10 mg) by mouth 2 times a day., Disp: 60 tablet, Rfl: 1     Allergies:  Allergies   Allergen Reactions    Codeine Nausea And Vomiting and Unknown    Hydrocodone-Homatropine Diarrhea     Vomiting        Physical Exam:  Last Recorded Vitals  Temperature 36.4 °C (97.6 °F), height 1.626 m (5' 4\").  General:     General appearance: Well-developed, well-nourished in no acute distress.  She has sound of mild crepitus or wheezing with exhalation.  Occasional wet cough     Voice:  normal       Head/face: Normal appearance; nontender to palpation     Facial nerve function: Normal and symmetric bilaterally.    Oral/oropharynx:     Oral vestibule: Normal labial and gingival mucosa     Tongue/floor of mouth: Normal without lesion     Oropharynx: Clear.  No lesions present of the hard/soft palate, posterior pharynx    Neck:     Neck: Normal appearance, trachea midline     Salivary glands: Normal to palpation bilaterally     Lymph nodes: No cervical lymphadenopathy to palpation     Thyroid: No thyromegaly.  No palpable nodules     Range of motion: Normal    Neurological:     Cortical functions: Alert and oriented x3, appropriate affect       Larynx/hypopharynx:     Laryngeal findings: Mirror exam inadequate or limited secondary to enlarged base of tongue and/or excessive gagging    Ear:     Ear canal: Normal bilaterally after cleaning moderate cerumen bilaterally      Tympanic membrane: Intact and mobile " bilaterally     Pinna: Normal bilaterally.     Hearing:  Gross hearing assessment normal by voice    Nose:     Visualized using: Anterior rhinoscopy     Nasopharynx: Inadequate mirror exam secondary to gag, anatomy.       Nasal dorsum: Nontraumatic midline appearance     Septum: Midline     Inferior turbinates: Normally sized     Mucosa: Bilateral, pink, normal appearing       Assessment/Plan   Ears cleaned bilaterally.  Cpm.  Recheck 6 mos, sooner aaron Driver MD

## 2025-06-24 PROBLEM — R41.3 MEMORY LOSS: Status: ACTIVE | Noted: 2025-06-24

## 2025-06-24 PROBLEM — G56.01 CARPAL TUNNEL SYNDROME OF RIGHT WRIST: Status: ACTIVE | Noted: 2025-06-24

## 2025-08-05 ENCOUNTER — TELEPHONE (OUTPATIENT)
Dept: AUDIOLOGY | Facility: CLINIC | Age: OVER 89
End: 2025-08-05
Payer: MEDICARE

## 2025-08-05 NOTE — TELEPHONE ENCOUNTER
Priscilla's daughter Jennifer called in because left hearing aid was recently lost. After looking for a few days Jennifer wanted to know the options she has.     [employer] was contacted and they no longer make the Jt model that Priscilla currently wears in her right ear. Jennifer decided that she does not want to replace both hearing aids at this time despite this and she understands that hearing aids will not communicate with each other. She also did not want to order the most current hearing aid (Edge AI) as the most current hearing aid has a different kind of  which would complicate things too much.    Jennifer also requested a pink faceplate to match current right hearing aid and a RED shell color; she did not want blue despite this being for the left ear. She wanted to try red as she thinks it may be easier to find if lost in the future    The order being placed will be for a left [employer] Evolv AI ITE 1600 rechargeable hearing aid that will be created by [employer] based on impressions on file.     Patient's daughter will pay $1,750 at the F. Jennifer would like to have new hearing aid as per [employer] the [employer] wali has a feature that allows searching for lost hearing aid.

## 2025-08-08 ENCOUNTER — TELEPHONE (OUTPATIENT)
Dept: AUDIOLOGY | Facility: CLINIC | Age: OVER 89
End: 2025-08-08
Payer: MEDICARE

## 2025-08-08 NOTE — TELEPHONE ENCOUNTER
Went over options for order with [facility] audiology to confirm that new hearing aid will be as identical as possible to old hearing aid. Only main change is model and shell color. Paper order form filled out per their instructions and emailed to  [email or web address].

## 2025-08-21 ENCOUNTER — TELEPHONE (OUTPATIENT)
Dept: AUDIOLOGY | Facility: CLINIC | Age: OVER 89
End: 2025-08-21

## 2025-08-21 ENCOUNTER — APPOINTMENT (OUTPATIENT)
Dept: AUDIOLOGY | Facility: CLINIC | Age: OVER 89
End: 2025-08-21

## 2025-08-21 ENCOUNTER — APPOINTMENT (OUTPATIENT)
Dept: AUDIOLOGY | Facility: CLINIC | Age: OVER 89
End: 2025-08-21
Payer: MEDICARE

## 2025-08-26 ENCOUNTER — NURSE TRIAGE (OUTPATIENT)
Dept: AUDIOLOGY | Facility: CLINIC | Age: OVER 89
End: 2025-08-26
Payer: MEDICARE

## 2025-08-27 ENCOUNTER — TELEPHONE (OUTPATIENT)
Dept: AUDIOLOGY | Facility: CLINIC | Age: OVER 89
End: 2025-08-27
Payer: MEDICARE

## 2025-08-28 ENCOUNTER — CLINICAL SUPPORT (OUTPATIENT)
Dept: AUDIOLOGY | Facility: CLINIC | Age: OVER 89
End: 2025-08-28
Payer: MEDICARE

## 2025-08-28 DIAGNOSIS — H90.3 SENSORINEURAL HEARING LOSS (SNHL) OF BOTH EARS: Primary | ICD-10-CM

## 2025-08-28 PROCEDURE — V5256 HEARING AID, DIGIT, MON, ITE: HCPCS

## 2025-08-28 PROCEDURE — V5241 DISPENSING FEE, MONAURAL: HCPCS

## 2025-09-05 ENCOUNTER — APPOINTMENT (OUTPATIENT)
Dept: OTOLARYNGOLOGY | Facility: CLINIC | Age: OVER 89
End: 2025-09-05
Payer: MEDICARE

## 2025-09-11 ENCOUNTER — APPOINTMENT (OUTPATIENT)
Dept: AUDIOLOGY | Facility: CLINIC | Age: OVER 89
End: 2025-09-11
Payer: MEDICARE

## 2025-09-11 ENCOUNTER — APPOINTMENT (OUTPATIENT)
Dept: AUDIOLOGY | Facility: CLINIC | Age: OVER 89
End: 2025-09-11

## 2025-09-12 ENCOUNTER — APPOINTMENT (OUTPATIENT)
Dept: AUDIOLOGY | Facility: CLINIC | Age: OVER 89
End: 2025-09-12
Payer: MEDICARE

## 2025-10-31 ENCOUNTER — APPOINTMENT (OUTPATIENT)
Dept: OTOLARYNGOLOGY | Facility: CLINIC | Age: OVER 89
End: 2025-10-31
Payer: MEDICARE